# Patient Record
Sex: MALE | Race: WHITE | NOT HISPANIC OR LATINO | URBAN - METROPOLITAN AREA
[De-identification: names, ages, dates, MRNs, and addresses within clinical notes are randomized per-mention and may not be internally consistent; named-entity substitution may affect disease eponyms.]

---

## 2018-12-15 ENCOUNTER — EMERGENCY (EMERGENCY)
Facility: HOSPITAL | Age: 53
LOS: 1 days | Discharge: HOME | End: 2018-12-15
Admitting: EMERGENCY MEDICINE
Payer: SELF-PAY

## 2018-12-15 VITALS
HEART RATE: 98 BPM | DIASTOLIC BLOOD PRESSURE: 77 MMHG | OXYGEN SATURATION: 98 % | RESPIRATION RATE: 18 BRPM | SYSTOLIC BLOOD PRESSURE: 130 MMHG | TEMPERATURE: 98 F

## 2018-12-15 PROCEDURE — 99053 MED SERV 10PM-8AM 24 HR FAC: CPT

## 2018-12-15 PROCEDURE — 99284 EMERGENCY DEPT VISIT MOD MDM: CPT | Mod: 25

## 2018-12-15 NOTE — ED ADULT NURSE NOTE - OBJECTIVE STATEMENT
Pt BIBA for AMS, admits to ETOH abuse. Airway patent and breathing is spontaneous and unlabored Pt BIBA for AMS, admits to ETOH abuse. Airway patent and breathing is spontaneous and unlabored, no injuries noted. Ambulating with Pt BIBA for AMS, admits to ETOH abuse. Airway patent and breathing is spontaneous and unlabored, no injuries noted. Ambulating with steady gait w/o assistance.

## 2018-12-15 NOTE — ED ADULT NURSE NOTE - CHPI ED NUR SYMPTOMS NEG
no nausea/no abdominal distension/no chills/no abdominal pain/no fever/no pain/no vomiting/no weakness

## 2018-12-15 NOTE — ED ADULT TRIAGE NOTE - CHIEF COMPLAINT QUOTE
BIBA for etoh intoxication and AMS. Patient is awake alert responsive and ambulatory with assist. As per ems, patient was picked up [place] [address] No signs of injury noted to pt's person. Refusing vs at triage, will reattempt accordingly.

## 2018-12-15 NOTE — ED ADULT NURSE NOTE - NSIMPLEMENTINTERV_GEN_ALL_ED
Implemented All Fall Risk Interventions:  [place] to call system. Call bell, personal items and telephone within reach. Instruct patient to call for assistance. Room bathroom lighting operational. Non-slip footwear when patient is off stretcher. Physically safe environment: no spills, clutter or unnecessary equipment. Stretcher in lowest position, wheels locked, appropriate side rails in place. Provide visual cue, wrist band, yellow gown, etc. Monitor gait and stability. Monitor for mental status changes and reorient to person, place, and time. Review medications for side effects contributing to fall risk. Reinforce activity limits and safety measures with patient and family.

## 2018-12-16 NOTE — ED PROVIDER NOTE - MEDICAL DECISION MAKING DETAILS
alcohol intox, dispo pending sobriety alcohol intox, dispo pending sobriety  At the time of discharge from the Emergency Department, the patient is alert with fluent appropriate speech and ambulatory without difficulty. A complete medical screening examination was performed and no emergency medical condition was identified.

## 2018-12-19 DIAGNOSIS — F10.129 ALCOHOL ABUSE WITH INTOXICATION, UNSPECIFIED: ICD-10-CM

## 2018-12-29 ENCOUNTER — EMERGENCY (EMERGENCY)
Facility: HOSPITAL | Age: 53
LOS: 1 days | Discharge: HOME | End: 2018-12-29
Attending: EMERGENCY MEDICINE | Admitting: EMERGENCY MEDICINE
Payer: SELF-PAY

## 2018-12-29 VITALS
TEMPERATURE: 97 F | RESPIRATION RATE: 18 BRPM | DIASTOLIC BLOOD PRESSURE: 64 MMHG | OXYGEN SATURATION: 98 % | SYSTOLIC BLOOD PRESSURE: 122 MMHG | HEART RATE: 84 BPM

## 2018-12-29 DIAGNOSIS — F10.129 ALCOHOL ABUSE WITH INTOXICATION, UNSPECIFIED: ICD-10-CM

## 2018-12-29 LAB — ETHANOL SERPL-MCNC: 335 MG/DL — HIGH

## 2018-12-29 PROCEDURE — 99220: CPT

## 2018-12-29 NOTE — ED ADULT TRIAGE NOTE - CHIEF COMPLAINT QUOTE
biba for ams due to etoh intoxication as per ems. Patient is sleeping in stretcher but easily rousable to verbal and tactile stimuli. No injury noted at triage. Bystanders called for him on [address] because he was laying in the street.

## 2018-12-29 NOTE — ED PROVIDER NOTE - MEDICAL DECISION MAKING DETAILS
Alcohol intox BIBEMS after bystanders called, no signs of trauma, will place in CDU, patient unsafe for d/c

## 2018-12-29 NOTE — ED ADULT NURSE NOTE - NSIMPLEMENTINTERV_GEN_ALL_ED
Implemented All Fall Risk Interventions:  [place] to call system. Call bell, personal items and telephone within reach. Instruct patient to call for assistance. Room bathroom lighting operational. Non-slip footwear when patient is off stretcher. Physically safe environment: no spills, clutter or unnecessary equipment. Stretcher in lowest position, wheels locked, appropriate side rails in place. Provide visual cue, wrist band, yellow gown, etc. Monitor gait and stability. Monitor for mental status changes and reorient to person, place, and time. Review medications for side effects contributing to fall risk. Reinforce activity limits and safety measures with patient and family.

## 2018-12-30 VITALS
DIASTOLIC BLOOD PRESSURE: 71 MMHG | TEMPERATURE: 97 F | SYSTOLIC BLOOD PRESSURE: 114 MMHG | RESPIRATION RATE: 16 BRPM | HEART RATE: 67 BPM | OXYGEN SATURATION: 97 %

## 2018-12-30 PROCEDURE — 99217: CPT

## 2018-12-30 NOTE — ED CDU PROVIDER DISPOSITION NOTE - CLINICAL COURSE
Now awake and alert and wishes to be discharged.  Gait steady.  Oriented to person place and time of day.

## 2018-12-30 NOTE — ED CDU PROVIDER DISPOSITION NOTE - NSFOLLOWUPINSTRUCTIONS_ED_ALL_ED_FT
Please refrain from drinking alcohol.  Please call the patient access center for a primary care follow up or see your doctor this week.

## 2018-12-30 NOTE — ED CDU PROVIDER DISPOSITION NOTE - NSFOLLOWUPCLINICS_GEN_ALL_ED_FT
Alcoholics Anonymous - 24 hour hotline  Alcoholics Anonymous  .     Phone: [phone]  Fax:   Follow Up Time:

## 2019-02-03 NOTE — ED ADULT NURSE NOTE - NS ED NURSE RECORD ANOTHER HT AND WT
Pt refusing evening medications at this time. He said he will take them after he eats dinner. Medications rescheduled.  Pt waiting for his Mom to bring him dinner.    No

## 2019-03-31 ENCOUNTER — EMERGENCY (EMERGENCY)
Facility: HOSPITAL | Age: 54
LOS: 1 days | Discharge: HOME | End: 2019-03-31
Attending: EMERGENCY MEDICINE | Admitting: EMERGENCY MEDICINE
Payer: SELF-PAY

## 2019-03-31 VITALS
OXYGEN SATURATION: 97 % | RESPIRATION RATE: 17 BRPM | DIASTOLIC BLOOD PRESSURE: 80 MMHG | TEMPERATURE: 98 F | HEART RATE: 74 BPM | SYSTOLIC BLOOD PRESSURE: 153 MMHG

## 2019-03-31 PROCEDURE — 99284 EMERGENCY DEPT VISIT MOD MDM: CPT

## 2019-04-01 VITALS
TEMPERATURE: 97 F | OXYGEN SATURATION: 98 % | DIASTOLIC BLOOD PRESSURE: 80 MMHG | SYSTOLIC BLOOD PRESSURE: 128 MMHG | HEART RATE: 71 BPM | RESPIRATION RATE: 18 BRPM

## 2019-04-01 NOTE — ED PROVIDER NOTE - UNABLE TO OBTAIN
patient arrives intoxicated unable to provide a history or cooperate with physical exam Urgent need for Intervention

## 2019-04-01 NOTE — ED ADULT NURSE NOTE - NSIMPLEMENTINTERV_GEN_ALL_ED
Implemented All Fall Risk Interventions:  [place] to call system. Call bell, personal items and telephone within reach. Instruct patient to call for assistance. Room bathroom lighting operational. Non-slip footwear when patient is off stretcher. Physically safe environment: no spills, clutter or unnecessary equipment. Stretcher in lowest position, wheels locked, appropriate side rails in place. Provide visual cue, wrist band, yellow gown, etc. Monitor gait and stability. Monitor for mental status changes and reorient to person, place, and time. Review medications for side effects contributing to fall risk. Reinforce activity limits and safety measures with patient and family.

## 2019-04-03 DIAGNOSIS — F10.129 ALCOHOL ABUSE WITH INTOXICATION, UNSPECIFIED: ICD-10-CM

## 2019-04-03 DIAGNOSIS — R41.82 ALTERED MENTAL STATUS, UNSPECIFIED: ICD-10-CM

## 2019-07-16 NOTE — ED CDU PROVIDER DISPOSITION NOTE - NS ED MD DISPO DISCHARGE CCDA
Order placed for Neurology. Ban[employer]! message sent to patient with neurology number. Patient/Caregiver provided printed discharge information.

## 2020-04-10 ENCOUNTER — EMERGENCY (EMERGENCY)
Facility: HOSPITAL | Age: 55
LOS: 1 days | Discharge: HOME | End: 2020-04-10
Attending: EMERGENCY MEDICINE
Payer: SELF-PAY

## 2020-04-10 VITALS
HEART RATE: 74 BPM | SYSTOLIC BLOOD PRESSURE: 126 MMHG | OXYGEN SATURATION: 98 % | RESPIRATION RATE: 20 BRPM | HEIGHT: 73 IN | TEMPERATURE: 99 F | WEIGHT: 214.95 LBS | DIASTOLIC BLOOD PRESSURE: 83 MMHG

## 2020-04-10 LAB — ETHANOL SERPL-MCNC: 381 MG/DL — HIGH (ref 0–10)

## 2020-04-10 PROCEDURE — 70450 CT HEAD/BRAIN W/O DYE: CPT | Mod: 26

## 2020-04-10 NOTE — ED PROVIDER NOTE - PROGRESS NOTE DETAILS
Alcohol level appropriately elevated for degree of unresponsiveness.  Signed out to Gil Jackson pending CT head and sobriety/ disposition Manuel: Patient endorsed to me to f/u CT and reassess for clinical sobriety. ETOH 380. CT negative for acute findings. Patient reassessed and ambulated with steady gait and no complaints. Will DC home.

## 2020-04-10 NOTE — ED ADULT NURSE NOTE - OBJECTIVE STATEMENT
Pt received in #29[facility] via EMS with c/o alcohol intoxication. Pt is minimall responsive to painful stimuli but with spontaneous breathing with adequate chest rise. No visible signs of trauma noted.

## 2020-04-10 NOTE — ED PROVIDER NOTE - PATIENT PORTAL LINK FT
You can access the FollowMyHealth Patient Portal offered by [facility] by registering at the following website: [email or web address]. By joining [employer]’s FollowMyHealth portal, you will also be able to view your health information using other applications (apps) compatible with our system.

## 2020-04-10 NOTE — ED ADULT NURSE NOTE - NS ED NURSE DC INFO COMPLEXITY
Discharge instructions given and discussed, signed copy in chart. Pt verbalized understanding and all questions answered. All prescriptions sent to pt pharmacy and [facility]. Pt verbalized understanding of need to  lovenox from [facility]. Pt provided education on lovenox administration, demonstrated administration. Pt discharged home in stable condition on 3L O2 via wheelchair escorted by nurse. Personal belongings with patient. IV removed and tolerated well. Tele box removed, monitor room notified.    Simple: Patient demonstrates quick and easy understanding

## 2020-04-10 NOTE — ED PROVIDER NOTE - CARE PLAN
Principal Discharge DX:	Alcoholic intoxication without complication  Secondary Diagnosis:	Alteration of consciousness

## 2020-04-10 NOTE — ED PROVIDER NOTE - CLINICAL SUMMARY MEDICAL DECISION MAKING FREE TEXT BOX
No overt signs of trauma however pt responds to deep pain only, Will obtain CT scan brain and alcohol level.

## 2020-04-11 VITALS
HEART RATE: 76 BPM | SYSTOLIC BLOOD PRESSURE: 128 MMHG | TEMPERATURE: 98 F | DIASTOLIC BLOOD PRESSURE: 71 MMHG | OXYGEN SATURATION: 100 % | RESPIRATION RATE: 18 BRPM

## 2020-04-11 PROCEDURE — 80307 DRUG TEST PRSMV CHEM ANLYZR: CPT

## 2020-04-11 PROCEDURE — 99285 EMERGENCY DEPT VISIT HI MDM: CPT | Mod: 25

## 2020-04-11 PROCEDURE — 99285 EMERGENCY DEPT VISIT HI MDM: CPT

## 2020-04-11 PROCEDURE — 70450 CT HEAD/BRAIN W/O DYE: CPT

## 2020-04-11 PROCEDURE — 82962 GLUCOSE BLOOD TEST: CPT

## 2020-04-11 PROCEDURE — 36415 COLL VENOUS BLD VENIPUNCTURE: CPT

## 2020-07-03 ENCOUNTER — EMERGENCY (EMERGENCY)
Facility: HOSPITAL | Age: 55
LOS: 1 days | Discharge: HOME | End: 2020-07-03
Admitting: EMERGENCY MEDICINE
Payer: MEDICAID

## 2020-07-03 VITALS
SYSTOLIC BLOOD PRESSURE: 128 MMHG | HEART RATE: 80 BPM | DIASTOLIC BLOOD PRESSURE: 80 MMHG | TEMPERATURE: 99 F | OXYGEN SATURATION: 95 % | RESPIRATION RATE: 18 BRPM
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
## 2020-07-03 DIAGNOSIS — R41.82 ALTERED MENTAL STATUS, UNSPECIFIED: ICD-10-CM

## 2020-07-03 DIAGNOSIS — F10.129 ALCOHOL ABUSE WITH INTOXICATION, UNSPECIFIED: ICD-10-CM

## 2020-07-03 PROCEDURE — 99284 EMERGENCY DEPT VISIT MOD MDM: CPT

## 2020-07-03 NOTE — ED ADULT TRIAGE NOTE - CHIEF COMPLAINT QUOTE
lukas for AMS from [facility]. Accomp by friend. as per ems, patient drank at least one pint of vodka. Patient arrives with no injuries noted.

## 2020-07-03 NOTE — ED ADULT NURSE NOTE - CHIEF COMPLAINT QUOTE
lukas for AMS from [facility]. Accomp by friend. as per ems, patient drank at least one pint of vodka. Patient arrives with no injuries noted.

## 2020-07-03 NOTE — ED PROVIDER NOTE - OBJECTIVE STATEMENT
54 yo M with no known PMHx, BIBA with friend for AMS.  Pt was found altered and friend reports that he had at lease one pint of alcohol 54 yo M with PMHx of alcoholism, BIBA with friend for AMS.  Pt was found altered and friend reports that he had at lease one pint of alcohol today and found him difficult to arouse. Pt BIBA for public EtOH intox. Admits to having heavy EtOH intake today.  Denies drug use or other illicits. No acute medical complaints or apparent trauma noted.  Minimally cooperative with hx and ROS due to heavy intox. No bleeding, respiratory distress, pain, vomiting, or urinary/bowel incontinence noted.

## 2020-07-03 NOTE — ED PROVIDER NOTE - PHYSICAL EXAMINATION
Gen - Unkempt M, flushed appearing, +AOB, lethargic but arousable by verbal stimuli  Skin - warm, dry, intact  HEENT - AT/NC, PERRL, mild conjunctival injection, pupils 3mm b/l, TM intact with no hemotympanium b/l, no facial contusion or periorbital ecchymosis, o/p clear, uvula midline, airway patent, neck supple with no step off or midline tenderness, FROM   CV - S1S2, R/R/R  Resp - respiration non-labored, CTAB, symmetric bs b/l, no r/r/w  GI - NABS, soft, ND, NT, no rebound or guarding, no CVAT b/l  MS - moving all extremities, no c/c/e   Neuro - Lethargic but arousable by verbal stimuli, slurred speech and unsteady gait

## 2020-07-03 NOTE — ED PROVIDER NOTE - PATIENT PORTAL LINK FT
You can access the FollowMyHealth Patient Portal offered by [facility] by registering at the following website: [email or web address]. By joining [employer]’s FollowMyHealth portal, you will also be able to view your health information using other applications (apps) compatible with our system.

## 2020-07-03 NOTE — ED ADULT NURSE NOTE - OBJECTIVE STATEMENT
intoxicated bib ema and sponsor nil obvious injury to person, admits to alcohol tonight (vodka) denies illict drug use , talking in full sentences, calm and compliant

## 2020-07-03 NOTE — ED PROVIDER NOTE - NSFOLLOWUPINSTRUCTIONS_ED_ALL_ED_FT
Alcohol Abuse    Alcohol intoxication occurs when the amount of alcohol that a person has consumed impairs his or her ability to mentally and physically function. Chronic alcohol consumption can also lead to a variety of health issues including neurological disease, stomach disease, heart disease, liver disease, etc. Do not drive after drinking alcohol. Drinking enough alcohol to end up in an Emergency Room suggests you may have an alcohol abuse problem. Seek help at a drug addiction center.    SEEK IMMEDIATE MEDICAL CARE IF YOU HAVE ANY OF THE FOLLOWING SYMPTOMS: seizures, vomiting blood, blood in your stool, lightheadedness/dizziness, or becoming shaky to tremulous when you stop drinking. Alcohol Abuse    Alcohol intoxication occurs when the amount of alcohol that a person has consumed impairs his or her ability to mentally and physically function. Chronic alcohol consumption can also lead to a variety of health issues including neurological disease, stomach disease, heart disease, liver disease, etc. Do not drive after drinking alcohol. Drinking enough alcohol to end up in an Emergency Room suggests you may have an alcohol abuse problem. Seek help at a drug addiction center.    SEEK IMMEDIATE MEDICAL CARE IF YOU HAVE ANY OF THE FOLLOWING SYMPTOMS: seizures, vomiting blood, blood in your stool, lightheadedness/dizziness, or becoming shaky to tremulous when you stop drinking.    Follow up with your primary care doctor or clinics listed below if you do not have a doctor,    [address] [address]  To make an appointment, call [phone]    [facility]  Address: [address] [place] [id number]  Appointment Center: [phone] ([phone])     Return immediately for any new or worsening symptoms or any new concerns

## 2020-07-03 NOTE — ED PROVIDER NOTE - PRO INTERPRETER NEED 2
PT arrived to ED via EMS c/o a seizure. PT had 3 witnessed seizure from his roommates.  Per roommates, patient has not been taking his seizure medication but has been drinking
English

## 2020-10-14 NOTE — ED ADULT TRIAGE NOTE - NS ED NURSE AMBULANCES
----- Message from Hannah Infante MD sent at 10/13/2020  4:25 PM CDT -----  Labs were OK [facility]

## 2021-01-12 NOTE — ED CDU PROVIDER INITIAL DAY NOTE - SKIN, MLM
<-------- Click here to INCLUDE CoVID-19 Discharge Instructions
Skin normal color for race, warm, dry and intact. No evidence of rash.

## 2022-03-06 ENCOUNTER — EMERGENCY (EMERGENCY)
Facility: HOSPITAL | Age: 57
LOS: 1 days | Discharge: HOME | End: 2022-03-06
Attending: EMERGENCY MEDICINE | Admitting: EMERGENCY MEDICINE
Payer: MEDICAID

## 2022-03-06 VITALS
RESPIRATION RATE: 16 BRPM | SYSTOLIC BLOOD PRESSURE: 134 MMHG | DIASTOLIC BLOOD PRESSURE: 82 MMHG | TEMPERATURE: 98 F | OXYGEN SATURATION: 97 % | HEART RATE: 61 BPM

## 2022-03-06 VITALS
SYSTOLIC BLOOD PRESSURE: 119 MMHG | TEMPERATURE: 98 F | DIASTOLIC BLOOD PRESSURE: 78 MMHG | HEART RATE: 70 BPM | OXYGEN SATURATION: 99 % | RESPIRATION RATE: 18 BRPM | WEIGHT: 175.05 LBS

## 2022-03-06 PROCEDURE — 99284 EMERGENCY DEPT VISIT MOD MDM: CPT

## 2022-03-06 NOTE — ED PROVIDER NOTE - NS ED ATTENDING STATEMENT MOD
"Injectable Influenza Immunization Documentation    1.  Has the patient received the information for the injectable influenza vaccine? YES     2. Is the patient 6 months of age or older? YES     3. Does the patient have any of the following contraindications?         Severe allergy to eggs? No     Severe allergic reaction to previous influenza vaccines? No   Severe allergy to latex? No       History of Guillain-[place] syndrome? No     Currently have a temperature greater than 100.4F? No        4.  Severely egg allergic patients should have flu vaccine eligibility assessed by an MD, RN, or pharmacist, and those who received flu vaccine should be observed for 15 min by an MD, RN, Pharmacist, Medical Technician, or member of clinic staff.\": YES    5. Latex-allergic patients should be given latex-free influenza vaccine. Please reference the Vaccine latex table to determine if your clinic s product is latex-containing.       Vaccination given by Kathryn Vann CMA          " Attending Only

## 2022-03-06 NOTE — ED ADULT NURSE NOTE - OBJECTIVE STATEMENT
Pt came in c/o of ams. Pt reports drinking etoh today. As per EMS pt found with etoh. No s/s of head injury/trauma/bleeding. Pt responsive to voice, Bed alarm on. Pt sleeping comfortably in bed

## 2022-03-06 NOTE — ED ADULT NURSE REASSESSMENT NOTE - NS ED NURSE REASSESS COMMENT FT1
Pt received from Daniel BURK. Pt resting comfortably in bed. No s/s of acute distress. Pt given dc papers. A&Ox3 speaking in full sentences. ambulatory with steady gait. Will continue to monitor

## 2022-03-06 NOTE — ED ADULT TRIAGE NOTE - CHIEF COMPLAINT QUOTE
here for ams, pt admits to drinking alcohol- was found lying down outside a [address] with four locos. pt responds to voice

## 2022-03-06 NOTE — ED PROVIDER NOTE - PATIENT PORTAL LINK FT
You can access the FollowMyHealth Patient Portal offered by [facility] by registering at the following website: [email or web address]. By joining [employer]’s FollowMyHealth portal, you will also be able to view your health information using other applications (apps) compatible with our system.

## 2022-03-08 DIAGNOSIS — F10.929 ALCOHOL USE, UNSPECIFIED WITH INTOXICATION, UNSPECIFIED: ICD-10-CM

## 2022-03-08 DIAGNOSIS — R41.82 ALTERED MENTAL STATUS, UNSPECIFIED: ICD-10-CM

## 2022-08-05 ENCOUNTER — EMERGENCY (EMERGENCY)
Facility: HOSPITAL | Age: 57
LOS: 1 days | Discharge: HOME | End: 2022-08-05
Attending: EMERGENCY MEDICINE | Admitting: EMERGENCY MEDICINE

## 2022-08-05 ENCOUNTER — EMERGENCY (EMERGENCY)
Facility: HOSPITAL | Age: 57
LOS: 1 days | Discharge: HOME | End: 2022-08-05
Admitting: EMERGENCY MEDICINE
Payer: MEDICAID

## 2022-08-05 VITALS
DIASTOLIC BLOOD PRESSURE: 76 MMHG | SYSTOLIC BLOOD PRESSURE: 120 MMHG | RESPIRATION RATE: 18 BRPM | HEART RATE: 77 BPM | TEMPERATURE: 97 F | OXYGEN SATURATION: 95 % | WEIGHT: 190.04 LBS

## 2022-08-05 VITALS
SYSTOLIC BLOOD PRESSURE: 124 MMHG | OXYGEN SATURATION: 96 % | HEART RATE: 71 BPM | RESPIRATION RATE: 18 BRPM | DIASTOLIC BLOOD PRESSURE: 69 MMHG | TEMPERATURE: 98 F

## 2022-08-05 PROCEDURE — 99283 EMERGENCY DEPT VISIT LOW MDM: CPT

## 2022-08-05 PROCEDURE — 99285 EMERGENCY DEPT VISIT HI MDM: CPT

## 2022-08-05 PROCEDURE — 70450 CT HEAD/BRAIN W/O DYE: CPT | Mod: 26

## 2022-08-05 PROCEDURE — 72125 CT NECK SPINE W/O DYE: CPT | Mod: 26

## 2022-08-05 NOTE — ED ADULT NURSE NOTE - NSIMPLEMENTINTERV_GEN_ALL_ED
Implemented All Fall Risk Interventions:  [place] to call system. Call bell, personal items and telephone within reach. Instruct patient to call for assistance. Room bathroom lighting operational. Non-slip footwear when patient is off stretcher. Physically safe environment: no spills, clutter or unnecessary equipment. Stretcher in lowest position, wheels locked, appropriate side rails in place. Provide visual cue, wrist band, yellow gown, etc. Monitor gait and stability. Monitor for mental status changes and reorient to person, place, and time. Review medications for side effects contributing to fall risk. Reinforce activity limits and safety measures with patient and family.

## 2022-08-05 NOTE — ED PROVIDER NOTE - OBJECTIVE STATEMENT
brought in by ems with altered mental status.  Admits to drinking.  Denies trauma.  Unable to provide further coherent history due to ams. States he is sorry.

## 2022-08-05 NOTE — ED ADULT NURSE REASSESSMENT NOTE - NS ED NURSE REASSESS COMMENT FT1
Pt care handed over from amelia spears, checked in on patient, laying on back with knees bent, feet flat on bed, well perfused, resp rate 16 bpm, eyes open to name, side rails x2 in upright position, pt woke up while writing this report, walked out to bathroom , not fully steady on feet, pleasant in mood, ushered to bathroom , will redirect back to bed

## 2022-08-05 NOTE — ED PROVIDER NOTE - NSFOLLOWUPINSTRUCTIONS_ED_ALL_ED_FT
Please see your primary care provider for followup.  Call for appointment.  If you have any problems with followup, please call the ED Referral Coordinator at [phone].  Return to the ER if symptoms worsen or other concerns.      Alcohol Abuse    Alcohol intoxication occurs when the amount of alcohol that a person has consumed impairs his or her ability to mentally and physically function. Chronic alcohol consumption can also lead to a variety of health issues including neurological disease, stomach disease, heart disease, liver disease, etc. Do not drive after drinking alcohol. Drinking enough alcohol to end up in an Emergency Room suggests you may have an alcohol abuse problem. Seek help at a drug addiction center.    SEEK IMMEDIATE MEDICAL CARE IF YOU HAVE ANY OF THE FOLLOWING SYMPTOMS: seizures, vomiting blood, blood in your stool, lightheadedness/dizziness, or becoming shaky to tremulous when you stop drinking.

## 2022-08-05 NOTE — ED PROVIDER NOTE - PHYSICAL EXAMINATION
· CONSTITUTIONAL: smells of alcohol, well nourished, awake, alert, oriented to person,  and in no apparent distress. speech slurred. stating he is sorry over and over  · ENMT: Airway patent, Nasal mucosa clear. Mouth with normal mucosa.  · HEAD: Head is atraumatic. Head shape is symmetrical.  · EYES: Clear bilaterally, pupils equal, round and reactive to light.  · CARDIAC: Normal rate, regular rhythm.  Heart sounds S1, S2.  · RESPIRATORY: Breath sounds clear and equal bilaterally.  · GASTROINTESTINAL: Abdomen soft, no guarding.  · MUSCULOSKELETAL: Spine appears normal, range of motion is not limited  · NEUROLOGICAL: Alert and oriented to self, speech slurred, not following formal neuro exam  · SKIN: Skin normal color for race, warm, dry and intact. No evidence of rash.  · PSYCHIATRIC: Alert and oriented to person. intoxicated affect. no apparent risk to self or others.  · HEME LYMPH: No adenopathy

## 2022-08-05 NOTE — ED ADULT NURSE NOTE - OBJECTIVE STATEMENT
pt. picked up from the street for altered mental status, PT. admits to drinking alcohol today denies any drug use, no visible injuries noted. Resting comfortably on the stretcher

## 2022-08-05 NOTE — ED ADULT TRIAGE NOTE - CHIEF COMPLAINT QUOTE
here for ams- pt is tearful in triage admits to drinking alcohol- pt with no obvious injury or bruising-

## 2022-08-05 NOTE — ED PROVIDER NOTE - PATIENT PORTAL LINK FT
You can access the FollowMyHealth Patient Portal offered by [facility] by registering at the following website: [email or web address]. By joining [employer]’s FollowMyHealth portal, you will also be able to view your health information using other applications (apps) compatible with our system.

## 2022-08-05 NOTE — ED ADULT TRIAGE NOTE - CHIEF COMPLAINT QUOTE
BIBA from street with AMS after drinking alcohol. no s/s of trauma/injury noted. BIBA from street with AMS after drinking alcohol. no s/s of trauma/injury reported.

## 2022-08-06 VITALS
DIASTOLIC BLOOD PRESSURE: 70 MMHG | OXYGEN SATURATION: 98 % | TEMPERATURE: 98 F | SYSTOLIC BLOOD PRESSURE: 108 MMHG | RESPIRATION RATE: 19 BRPM | HEART RATE: 68 BPM

## 2022-08-06 PROBLEM — Z78.9 OTHER SPECIFIED HEALTH STATUS: Chronic | Status: ACTIVE | Noted: 2018-12-29

## 2022-08-06 NOTE — ED PROVIDER NOTE - OBJECTIVE STATEMENT
58 yo m bibems for AMS, possibly 2/2 substance use, fall reported.  limited history 2/2 mental status.

## 2022-08-06 NOTE — ED PROVIDER NOTE - CLINICAL SUMMARY MEDICAL DECISION MAKING FREE TEXT BOX
ams, possibly 2/2 substance, fall reported, no trauma noted, protecting airway, will monitor for safety and reassessment for mental status

## 2022-08-06 NOTE — ED PROVIDER NOTE - PATIENT PORTAL LINK FT
You can access the FollowMyHealth Patient Portal offered by [facility] by registering at the following website: [email or web address]. By joining [employer]’s FollowMyHealth portal, you will also be able to view your health information using other applications (apps) compatible with our system.

## 2022-08-07 DIAGNOSIS — R41.82 ALTERED MENTAL STATUS, UNSPECIFIED: ICD-10-CM

## 2022-08-07 DIAGNOSIS — F10.920 ALCOHOL USE, UNSPECIFIED WITH INTOXICATION, UNCOMPLICATED: ICD-10-CM

## 2022-08-07 DIAGNOSIS — F10.929 ALCOHOL USE, UNSPECIFIED WITH INTOXICATION, UNSPECIFIED: ICD-10-CM

## 2023-04-12 NOTE — ED PROVIDER NOTE - NS_EDPROVIDERDISPOUSERTYPE_ED_A_ED
I have personally evaluated and examined the patient. The Attending was available to me as a supervising provider if needed. Thalidomide Counseling: I discussed with the patient the risks of thalidomide including but not limited to birth defects, anxiety, weakness, chest pain, dizziness, cough and severe allergy.

## 2024-03-06 ENCOUNTER — EMERGENCY (EMERGENCY)
Facility: HOSPITAL | Age: 59
LOS: 1 days | Discharge: HOME | End: 2024-03-06
Attending: EMERGENCY MEDICINE | Admitting: EMERGENCY MEDICINE
Payer: SELF-PAY

## 2024-03-06 VITALS
HEART RATE: 77 BPM | DIASTOLIC BLOOD PRESSURE: 73 MMHG | TEMPERATURE: 97 F | OXYGEN SATURATION: 96 % | SYSTOLIC BLOOD PRESSURE: 115 MMHG | RESPIRATION RATE: 18 BRPM

## 2024-03-06 VITALS
RESPIRATION RATE: 16 BRPM | TEMPERATURE: 98 F | OXYGEN SATURATION: 98 % | SYSTOLIC BLOOD PRESSURE: 133 MMHG | HEART RATE: 87 BPM | DIASTOLIC BLOOD PRESSURE: 71 MMHG

## 2024-03-06 DIAGNOSIS — F10.120 ALCOHOL ABUSE WITH INTOXICATION, UNCOMPLICATED: ICD-10-CM

## 2024-03-06 DIAGNOSIS — R41.82 ALTERED MENTAL STATUS, UNSPECIFIED: ICD-10-CM

## 2024-03-06 LAB
ETHANOL SERPL-MCNC: 365 MG/DL — HIGH
GLUCOSE BLDC GLUCOMTR-MCNC: 133 MG/DL — HIGH (ref 70–99)

## 2024-03-06 PROCEDURE — 99222 1ST HOSP IP/OBS MODERATE 55: CPT

## 2024-03-06 PROCEDURE — 99053 MED SERV 10PM-8AM 24 HR FAC: CPT

## 2024-03-06 NOTE — ED PROVIDER NOTE - OBJECTIVE STATEMENT
58-year-old male unknown past medical history, has had prior visits for ED alcohol intoxication presents with altered mental status.  Patient has alcohol on breath unable to provide a history or cooperate physical exam secondary to altered mental status

## 2024-03-06 NOTE — ED CDU PROVIDER DISPOSITION NOTE - NSFOLLOWUPINSTRUCTIONS_ED_ALL_ED_FT
Alcohol Use Disorder    WHAT YOU NEED TO KNOW:    Alcohol use disorder (AUD) is problem drinking. AUD includes alcohol abuse and alcohol dependency.     DISCHARGE INSTRUCTIONS:    Seek care immediately if:     Your heart is beating faster than usual.      You have hallucinations.      You cannot remember what happens while you are drinking.      You have seizures.    Contact your healthcare provider if:     You are anxious and have nausea.      Your hands are shaky and you are sweating heavily.      You have questions or concerns about your condition or care.    Follow up with your healthcare provider as directed: Do not try to stop drinking on your own. Your healthcare provider may need to help you withdraw from alcohol safely. He may need to admit you to the hospital. You may also need any of the following treatments:    Medicines to decrease your craving for alcohol      Support groups such as Alcoholics Anonymous       Therapy from a psychiatrist or psychologist       Admission to an inpatient facility for treatment for severe AUD    Interested in discussing options to reduce your alcohol or drug use?      [facility]: [phone]   [facility] Substance Abuse Services: [phone], option #2   Methadone Maintenance & Ambulatory Opiate Detox: [phone]  [facility]: [phone]  [facility]: [phone]  [facility]: [phone]    [facility]: [phone], option #2   [facility]: [phone]    [facility]: [phone]    [facility] Central Intake: [phone]  [facility] Chemical Dependency/Ancillary Withdrawal: [phone]  [facility] Methadone Maintenance: [phone]    [facility]: [phone]  [facility] Addiction Treatment Services: [phone]    [facility]: 8-894-1-HOPENY    [facility] Office of Alcoholism and Substance Abuse Services (OASAS): https://www.oasas.[place].gov/providerdirectory/  [facility] for Addiction Services and Psychotherapy Interventions Research (CASPIR)  www.[facility].org     Interested in discussing options to reduce your tobacco use?    [facility] for Tobacco Control:  [phone]  [facility] QUITLINE: 3-569-WD-QUITS ([phone])    Interested in learning more about substance use?      http://rethinkingdrinking.niaaa.nih.gov   https://www.drugabuse.gov/patients-families     Learn more about opioid overdose prevention programs in [facility]:  http://www.[facility].ny.[facility]/diseases/aids/general/opioid_overdose_prevention/

## 2024-03-06 NOTE — ED PROVIDER NOTE - CLINICAL SUMMARY MEDICAL DECISION MAKING FREE TEXT BOX
Patient presents with altered mental status likely secondary to EtOH intoxication. Patient maintained his airway, and will plan to metabolize to sobriety Patient with no head trauma to suggest intracranial hemorrhage, no overt signs of opioid intoxication or coingestion. No infectious symptoms and afebrile so doubt sepsis. Patient with no signs of any medical emergencies at this time.     Will continue to monitor for improving sobriety and draw alcohol level.

## 2024-03-06 NOTE — ED ADULT NURSE NOTE - CHIEF COMPLAINT QUOTE
biba from [place] for ams due to etoh ingestion per ems. Pt is not answering questions at triage or following commands. No obvious injuries noted.

## 2024-03-06 NOTE — ED ADULT NURSE NOTE - NSFALLUNIVINTERV_ED_ALL_ED
Bed/Stretcher in lowest position, wheels locked, appropriate side rails in place/Call bell, personal items and telephone in reach/Instruct patient to call for assistance before getting out of bed/chair/stretcher/Non-slip footwear applied when patient is off stretcher/[place] to call system/Physically safe environment - no spills, clutter or unnecessary equipment/Purposeful proactive rounding/Room/bathroom lighting operational, light cord in reach

## 2024-03-06 NOTE — ED CDU PROVIDER DISPOSITION NOTE - PATIENT PORTAL LINK FT
You can access the FollowMyHealth Patient Portal offered by [facility] by registering at the following website: [email or web address]. By joining [employer]’s FollowMyHealth portal, you will also be able to view your health information using other applications (apps) compatible with our system.

## 2024-03-06 NOTE — ED ADULT NURSE NOTE - OBJECTIVE STATEMENT
Pt in ED d/t ETOH intox. Per triage, EMS stated had alcohol tonight. Not following commands during triage. Airway intact, rise and fall of chest noted. Ongoing care.

## 2024-03-06 NOTE — ED ADULT NURSE REASSESSMENT NOTE - NS ED NURSE REASSESS COMMENT FT1
Pt assisted to use urinal, pt with unsteady movements and difficulty following directions. Remains on bed alarm. Pending sobriety.

## 2024-03-06 NOTE — ED ADULT TRIAGE NOTE - CHIEF COMPLAINT QUOTE
biba from [place] for ams due to etoh ingestion per ems. Pt is not answering questions at triage or following commands. No obvious injuries noted.

## 2024-06-21 ENCOUNTER — EMERGENCY (EMERGENCY)
Facility: HOSPITAL | Age: 59
LOS: 1 days | Discharge: HOME | End: 2024-06-21
Admitting: EMERGENCY MEDICINE
Payer: SELF-PAY

## 2024-06-21 VITALS
DIASTOLIC BLOOD PRESSURE: 61 MMHG | WEIGHT: 179.9 LBS | RESPIRATION RATE: 16 BRPM | HEART RATE: 69 BPM | SYSTOLIC BLOOD PRESSURE: 106 MMHG | OXYGEN SATURATION: 95 % | TEMPERATURE: 98 F

## 2024-06-21 VITALS
HEART RATE: 58 BPM | TEMPERATURE: 97 F | RESPIRATION RATE: 16 BRPM | SYSTOLIC BLOOD PRESSURE: 112 MMHG | DIASTOLIC BLOOD PRESSURE: 68 MMHG | OXYGEN SATURATION: 95 %

## 2024-06-21 PROCEDURE — 99283 EMERGENCY DEPT VISIT LOW MDM: CPT

## 2024-06-21 NOTE — ED ADULT NURSE NOTE - OBJECTIVE STATEMENT
pt opened eyes to name, P4earl admits to alcohol use today denies illict drug use , well perfused, sitting upright in bed,

## 2024-06-21 NOTE — ED PROVIDER NOTE - PATIENT PORTAL LINK FT
You can access the FollowMyHealth Patient Portal offered by [facility] by registering at the following website: [email or web address]. By joining [employer]’s FollowMyHealth portal, you will also be able to view your health information using other applications (apps) compatible with our system.

## 2024-06-21 NOTE — ED PROVIDER NOTE - OBJECTIVE STATEMENT
Patient is a 59-year-old male presents today with altered mental status.  Admits to drinking lots of alcohol earlier today.  States that he fell asleep.  States that he did not fall or injure his head or use any kind of drugs.  He has no medical complaints at this time.  He is able to speak in complete in full sentences.

## 2024-06-21 NOTE — ED PROVIDER NOTE - CLINICAL SUMMARY MEDICAL DECISION MAKING FREE TEXT BOX
Lesion Type: Abscess Suture Text: The incision was partially closed with Render Postcare In Note?: No Wound Care: Petrolatum Anesthesia Volume In Cc: 1.5 Epidermal Sutures: 4-0 Ethilon Size Of Lesion In Cm (Optional But May Be Required For Some Insurances): 0 Anesthesia Type: 1% lidocaine with epinephrine Drainage Type?: purulent  and cyst-like Dressing: dry sterile dressing Consent was obtained and risks were reviewed including but not limited to delayed wound healing, infection, need for multiple I and D's, and pain. Drainage Amount?: moderate Epidermal Closure: simple interrupted Curette Text (Optional): After the contents were expressed a curette was used to partially remove the cyst wall. Post-Care Instructions: I reviewed with the patient in detail post-care instructions. Patient should keep wound covered and call the office should any redness, pain, swelling or worsening occur. I&D Type: complicated Detail Level: Detailed Method: 11 blade Curette: Yes Patient presents with alcohol intoxication.  Denies trauma or drug use.  No evidence of falls or trauma.  Monitored in the ER for several hours for clinical sobriety.  Patient ambulating around the department, requesting discharge, tolerating p.o. prior to discharge.

## 2024-06-21 NOTE — ED PROVIDER NOTE - NSFOLLOWUPINSTRUCTIONS_ED_ALL_ED_FT
Alcohol Use Disorder    WHAT YOU NEED TO KNOW:    Alcohol use disorder (AUD) is problem drinking. AUD includes alcohol abuse and alcohol dependency.     DISCHARGE INSTRUCTIONS:    Seek care immediately if:     Your heart is beating faster than usual.      You have hallucinations.      You cannot remember what happens while you are drinking.      You have seizures.    Contact your healthcare provider if:     You are anxious and have nausea.      Your hands are shaky and you are sweating heavily.      You have questions or concerns about your condition or care.    Follow up with your healthcare provider as directed: Do not try to stop drinking on your own. Your healthcare provider may need to help you withdraw from alcohol safely. He may need to admit you to the hospital. You may also need any of the following treatments:    Medicines to decrease your craving for alcohol      Support groups such as Alcoholics Anonymous       Therapy from a psychiatrist or psychologist       Admission to an inpatient facility for treatment for severe AUD    Interested in discussing options to reduce your alcohol or drug use?      [facility]: [phone]   [facility] Substance Abuse Services: [phone], option #2   Methadone Maintenance & Ambulatory Opiate Detox: [phone]  [facility]: [phone]  [facility]: [phone]  [facility]: [phone]    [facility]: [phone], option #2   [facility]: [phone]    [facility]: [phone]    [facility] Central Intake: [phone]  [facility] Chemical Dependency/Ancillary Withdrawal: [phone]  [facility] Methadone Maintenance: [phone]    [facility]: [phone]  [facility] Addiction Treatment Services: [phone]    [facility]: 5-965-1-HOPENY    [facility] Office of Alcoholism and Substance Abuse Services (OASAS): https://www.oasas.[place].gov/providerdirectory/  [facility] for Addiction Services and Psychotherapy Interventions Research (CASPIR)  www.[facility].org     Interested in discussing options to reduce your tobacco use?    [facility] for Tobacco Control:  [phone]  [facility] QUITLINE: 4-381-TL-QUITS ([phone])    Interested in learning more about substance use?      http://rethinkingdrinking.niaaa.nih.gov   https://www.drugabuse.gov/patients-families     Learn more about opioid overdose prevention programs in [facility]:  http://www.[facility].ny.[facility]/diseases/aids/general/opioid_overdose_prevention/

## 2024-06-25 DIAGNOSIS — R41.82 ALTERED MENTAL STATUS, UNSPECIFIED: ICD-10-CM

## 2024-06-25 DIAGNOSIS — F10.920 ALCOHOL USE, UNSPECIFIED WITH INTOXICATION, UNCOMPLICATED: ICD-10-CM

## 2024-07-10 ENCOUNTER — EMERGENCY (EMERGENCY)
Age: 59
LOS: 1 days | Discharge: HOME | End: 2024-07-10
Attending: EMERGENCY MEDICINE | Admitting: EMERGENCY MEDICINE
Payer: MEDICAID

## 2024-07-10 VITALS
OXYGEN SATURATION: 98 % | HEART RATE: 95 BPM | RESPIRATION RATE: 18 BRPM | TEMPERATURE: 98 F | SYSTOLIC BLOOD PRESSURE: 135 MMHG | DIASTOLIC BLOOD PRESSURE: 71 MMHG | WEIGHT: 195.11 LBS

## 2024-07-10 PROCEDURE — 99283 EMERGENCY DEPT VISIT LOW MDM: CPT

## 2024-07-12 DIAGNOSIS — F10.129 ALCOHOL ABUSE WITH INTOXICATION, UNSPECIFIED: ICD-10-CM

## 2024-09-26 ENCOUNTER — INPATIENT (INPATIENT)
Facility: HOSPITAL | Age: 59
LOS: 2 days | Discharge: HOME | End: 2024-09-29
Attending: STUDENT IN AN ORGANIZED HEALTH CARE EDUCATION/TRAINING PROGRAM | Admitting: INTERNAL MEDICINE
Payer: COMMERCIAL

## 2024-09-26 VITALS
DIASTOLIC BLOOD PRESSURE: 91 MMHG | WEIGHT: 179.9 LBS | RESPIRATION RATE: 18 BRPM | HEART RATE: 115 BPM | SYSTOLIC BLOOD PRESSURE: 135 MMHG | OXYGEN SATURATION: 96 % | TEMPERATURE: 98 F

## 2024-09-26 LAB
ALBUMIN SERPL ELPH-MCNC: 3.7 G/DL — SIGNIFICANT CHANGE UP (ref 3.4–5)
ALP SERPL-CCNC: 88 U/L — SIGNIFICANT CHANGE UP (ref 40–120)
ALT FLD-CCNC: 56 U/L — HIGH (ref 12–42)
ANION GAP SERPL CALC-SCNC: 11 MMOL/L — SIGNIFICANT CHANGE UP (ref 9–16)
AST SERPL-CCNC: 58 U/L — HIGH (ref 15–37)
BILIRUB DIRECT SERPL-MCNC: 0.2 MG/DL — SIGNIFICANT CHANGE UP (ref 0–0.3)
BILIRUB INDIRECT FLD-MCNC: 0.4 MG/DL — SIGNIFICANT CHANGE UP (ref 0.2–1)
BILIRUB SERPL-MCNC: 0.6 MG/DL — SIGNIFICANT CHANGE UP (ref 0.2–1.2)
BUN SERPL-MCNC: 12 MG/DL — SIGNIFICANT CHANGE UP (ref 7–23)
CALCIUM SERPL-MCNC: 8 MG/DL — LOW (ref 8.5–10.5)
CHLORIDE SERPL-SCNC: 106 MMOL/L — SIGNIFICANT CHANGE UP (ref 96–108)
CO2 SERPL-SCNC: 27 MMOL/L — SIGNIFICANT CHANGE UP (ref 22–31)
CREAT SERPL-MCNC: 0.76 MG/DL — SIGNIFICANT CHANGE UP (ref 0.5–1.3)
EGFR: 104 ML/MIN/1.73M2 — SIGNIFICANT CHANGE UP
ETHANOL SERPL-MCNC: 207 MG/DL — HIGH
GLUCOSE SERPL-MCNC: 105 MG/DL — HIGH (ref 70–99)
MAGNESIUM SERPL-MCNC: 1.6 MG/DL — SIGNIFICANT CHANGE UP (ref 1.6–2.6)
PHOSPHATE SERPL-MCNC: 3.6 MG/DL — SIGNIFICANT CHANGE UP (ref 2.5–4.5)
POTASSIUM SERPL-MCNC: 3.9 MMOL/L — SIGNIFICANT CHANGE UP (ref 3.5–5.3)
POTASSIUM SERPL-SCNC: 3.9 MMOL/L — SIGNIFICANT CHANGE UP (ref 3.5–5.3)
PROT SERPL-MCNC: 7 G/DL — SIGNIFICANT CHANGE UP (ref 6.4–8.2)
SODIUM SERPL-SCNC: 144 MMOL/L — SIGNIFICANT CHANGE UP (ref 132–145)

## 2024-09-26 PROCEDURE — 99285 EMERGENCY DEPT VISIT HI MDM: CPT

## 2024-09-26 RX ORDER — MULTI VITAMIN/MINERAL SUPPLEMENT WITH ASCORBIC ACID, NIACIN, PYRIDOXINE, PANTOTHENIC ACID, FOLIC ACID, RIBOFLAVIN, THIAMIN, BIOTIN, COBALAMIN AND ZINC. 60; 20; 12.5; 10; 10; 1.7; 1.5; 1; .3; .006 MG/1; MG/1; MG/1; MG/1; MG/1; MG/1; MG/1; MG/1; MG/1; MG/1
1 TABLET, COATED ORAL DAILY
Refills: 0 | Status: DISCONTINUED | OUTPATIENT
Start: 2024-09-26 | End: 2024-09-29

## 2024-09-26 RX ORDER — SODIUM CHLORIDE IRRIG SOLUTION 0.9 %
2000 SOLUTION, IRRIGATION IRRIGATION ONCE
Refills: 0 | Status: COMPLETED | OUTPATIENT
Start: 2024-09-26 | End: 2024-09-26

## 2024-09-26 RX ORDER — CHLORDIAZEPOXIDE HCL 10 MG
25 CAPSULE ORAL ONCE
Refills: 0 | Status: DISCONTINUED | OUTPATIENT
Start: 2024-09-26 | End: 2024-09-26

## 2024-09-26 RX ORDER — SODIUM CHLORIDE 0.9 % (FLUSH) 0.9 %
1000 SYRINGE (ML) INJECTION ONCE
Refills: 0 | Status: COMPLETED | OUTPATIENT
Start: 2024-09-26 | End: 2024-09-26

## 2024-09-26 RX ORDER — PHENOBARBITAL 100 MG/1
160 TABLET ORAL ONCE
Refills: 0 | Status: DISCONTINUED | OUTPATIENT
Start: 2024-09-26 | End: 2024-09-26

## 2024-09-26 RX ORDER — THIAMINE HYDROCHLORIDE 100 MG/ML
100 INJECTION, SOLUTION INTRAMUSCULAR; INTRAVENOUS ONCE
Refills: 0 | Status: DISCONTINUED | OUTPATIENT
Start: 2024-09-26 | End: 2024-09-26

## 2024-09-26 RX ORDER — MAGNESIUM SULFATE 500 MG/ML
2 VIAL (ML) INJECTION ONCE
Refills: 0 | Status: COMPLETED | OUTPATIENT
Start: 2024-09-26 | End: 2024-09-26

## 2024-09-26 RX ORDER — FOLIC ACID 1 MG/1
1 TABLET ORAL DAILY
Refills: 0 | Status: DISCONTINUED | OUTPATIENT
Start: 2024-09-26 | End: 2024-09-29

## 2024-09-26 RX ORDER — THIAMINE HYDROCHLORIDE 100 MG/ML
100 INJECTION, SOLUTION INTRAMUSCULAR; INTRAVENOUS ONCE
Refills: 0 | Status: COMPLETED | OUTPATIENT
Start: 2024-09-26 | End: 2024-09-26

## 2024-09-26 RX ORDER — THIAMINE HYDROCHLORIDE 100 MG/ML
100 INJECTION, SOLUTION INTRAMUSCULAR; INTRAVENOUS DAILY
Refills: 0 | Status: DISCONTINUED | OUTPATIENT
Start: 2024-09-26 | End: 2024-09-26

## 2024-09-26 RX ORDER — SODIUM CHLORIDE IRRIG SOLUTION 0.9 %
1000 SOLUTION, IRRIGATION IRRIGATION
Refills: 0 | Status: DISCONTINUED | OUTPATIENT
Start: 2024-09-26 | End: 2024-09-26

## 2024-09-26 RX ADMIN — MULTI VITAMIN/MINERAL SUPPLEMENT WITH ASCORBIC ACID, NIACIN, PYRIDOXINE, PANTOTHENIC ACID, FOLIC ACID, RIBOFLAVIN, THIAMIN, BIOTIN, COBALAMIN AND ZINC. 1 TABLET(S): 60; 20; 12.5; 10; 10; 1.7; 1.5; 1; .3; .006 TABLET, COATED ORAL at 21:09

## 2024-09-26 RX ADMIN — Medication 40 MILLIEQUIVALENT(S): at 23:32

## 2024-09-26 RX ADMIN — Medication 1 MILLIGRAM(S): at 21:11

## 2024-09-26 RX ADMIN — THIAMINE HYDROCHLORIDE 100 MILLIGRAM(S): 100 INJECTION, SOLUTION INTRAMUSCULAR; INTRAVENOUS at 21:31

## 2024-09-26 RX ADMIN — Medication 1000 MILLILITER(S): at 21:04

## 2024-09-26 RX ADMIN — FOLIC ACID 1 MILLIGRAM(S): 1 TABLET ORAL at 21:09

## 2024-09-26 RX ADMIN — Medication 25 GRAM(S): at 23:36

## 2024-09-26 RX ADMIN — Medication 25 MILLIGRAM(S): at 21:09

## 2024-09-26 RX ADMIN — Medication 2000 MILLILITER(S): at 23:37

## 2024-09-26 NOTE — ED ADULT TRIAGE NOTE - CHIEF COMPLAINT QUOTE
Pt walked in c/o alcohol withdrawal. Endorsing BUE tremors and difficulty having thoughts. Hx of chronic ETOH use, states normally drinks 2 pints a day, last drink at 5pm.

## 2024-09-26 NOTE — ED ADULT NURSE NOTE - EXTENSIONS OF SELF_ADULT
What Is The Reason For Today's Visit?: Annual Full Body Skin Examination with No Concerns What Is The Reason For Today's Visit? (Being Monitored For X): concerning skin lesions on a periodic basis How Severe Are Your Spot(S)?: mild None

## 2024-09-26 NOTE — ED PROVIDER NOTE - CLINICAL SUMMARY MEDICAL DECISION MAKING FREE TEXT BOX
Alcohol withdrawal.  Patient takes Naltrexone and Wellbutrin.  Tremors, nausea, sweats.  CIWA 15 on arrival.  No cough or CP or SOB.  No external signs of trauma.  Patient is unkempt.  Last alcohol ingestion today.    CIWA went from 15 to 8 to 5  Case d/w Dr Flores ICU attending admit to monitored bed and give magnesium 2g IV, LR 2L, potassium 40mEq po, phenobarb 160mg IV

## 2024-09-26 NOTE — ED PROVIDER NOTE - OBJECTIVE STATEMENT
Alcohol withdrawal.  Patient takes Naltrexone and Wellbutrin.  Tremors, nausea, sweats.  CIWA 15 on arrival.  No cough or CP or SOB.  No external signs of trauma.  Patient is unkempt.  Last alcohol ingestion today.

## 2024-09-26 NOTE — ED PROVIDER NOTE - NS ED ATTENDING STATEMENT MOD
REF#[id number]-MVDR. ANITHA TEJADA-99 VISITS, EXPIRES 12/31/21    [facility] Specialist-no referral required, order in chart, pt informed by [id number]-[email or web address]   Attending Only

## 2024-09-26 NOTE — ED ADULT NURSE NOTE - OBJECTIVE STATEMENT
Pt is a 59y male c/o alcohol withdrawal. reports drinks 2 pints of vodka daily, last drink at 5pm. Arrives endorsing tremors, mild nausea, "hearing something", body tingling/numbness, and "can't get my thoughts straight. Pt AAOx4, speaking in complete sentences. CIWA 8 on exam.

## 2024-09-26 NOTE — ED ADULT NURSE NOTE - CHPI ED NUR SYMPTOMS NEG
no abdominal distension/no abdominal pain/no chills/no confusion/no disorientation/no fever/no pain/no vomiting

## 2024-09-26 NOTE — ED ADULT NURSE REASSESSMENT NOTE - NS ED NURSE REASSESS COMMENT FT1
Repeat CIWA 2, pt reports mild nausea and anxiety, when asked what anxious about states "I don't know just something you feel". Reports relief of tremors and negative upon visual exam, denies hearing "static noise" he heard earlier, denies tingling, denies seeing light flashes. Confirmed w/ MD Hormozdi administration of magnesium and potassium despite normal lab values, clinical indication explained and confirmed. Care on-going.

## 2024-09-27 LAB
ADD ON TEST-SPECIMEN IN LAB: SIGNIFICANT CHANGE UP
ALBUMIN SERPL ELPH-MCNC: 3.7 G/DL — SIGNIFICANT CHANGE UP (ref 3.3–5)
ALP SERPL-CCNC: 82 U/L — SIGNIFICANT CHANGE UP (ref 40–120)
ALT FLD-CCNC: 38 U/L — SIGNIFICANT CHANGE UP (ref 10–45)
ANION GAP SERPL CALC-SCNC: 8 MMOL/L — SIGNIFICANT CHANGE UP (ref 5–17)
ANION GAP SERPL CALC-SCNC: 9 MMOL/L — SIGNIFICANT CHANGE UP (ref 9–16)
AST SERPL-CCNC: 50 U/L — HIGH (ref 10–40)
BASOPHILS # BLD AUTO: 0.13 K/UL — SIGNIFICANT CHANGE UP (ref 0–0.2)
BASOPHILS NFR BLD AUTO: 1.7 % — SIGNIFICANT CHANGE UP (ref 0–2)
BILIRUB SERPL-MCNC: 0.9 MG/DL — SIGNIFICANT CHANGE UP (ref 0.2–1.2)
BLD GP AB SCN SERPL QL: NEGATIVE — SIGNIFICANT CHANGE UP
BUN SERPL-MCNC: 12 MG/DL — SIGNIFICANT CHANGE UP (ref 7–23)
BUN SERPL-MCNC: 15 MG/DL — SIGNIFICANT CHANGE UP (ref 7–23)
CALCIUM SERPL-MCNC: 7.7 MG/DL — LOW (ref 8.5–10.5)
CALCIUM SERPL-MCNC: 8.3 MG/DL — LOW (ref 8.4–10.5)
CHLORIDE SERPL-SCNC: 106 MMOL/L — SIGNIFICANT CHANGE UP (ref 96–108)
CHLORIDE SERPL-SCNC: 109 MMOL/L — HIGH (ref 96–108)
CO2 SERPL-SCNC: 27 MMOL/L — SIGNIFICANT CHANGE UP (ref 22–31)
CO2 SERPL-SCNC: 28 MMOL/L — SIGNIFICANT CHANGE UP (ref 22–31)
CREAT SERPL-MCNC: 0.68 MG/DL — SIGNIFICANT CHANGE UP (ref 0.5–1.3)
CREAT SERPL-MCNC: 0.79 MG/DL — SIGNIFICANT CHANGE UP (ref 0.5–1.3)
EGFR: 103 ML/MIN/1.73M2 — SIGNIFICANT CHANGE UP
EGFR: 108 ML/MIN/1.73M2 — SIGNIFICANT CHANGE UP
EOSINOPHIL # BLD AUTO: 0.26 K/UL — SIGNIFICANT CHANGE UP (ref 0–0.5)
EOSINOPHIL NFR BLD AUTO: 3.4 % — SIGNIFICANT CHANGE UP (ref 0–6)
GLUCOSE SERPL-MCNC: 77 MG/DL — SIGNIFICANT CHANGE UP (ref 70–99)
GLUCOSE SERPL-MCNC: 85 MG/DL — SIGNIFICANT CHANGE UP (ref 70–99)
HCT VFR BLD CALC: 37 % — LOW (ref 39–50)
HGB BLD-MCNC: 12.5 G/DL — LOW (ref 13–17)
IMM GRANULOCYTES NFR BLD AUTO: 0.5 % — SIGNIFICANT CHANGE UP (ref 0–0.9)
LYMPHOCYTES # BLD AUTO: 1.5 K/UL — SIGNIFICANT CHANGE UP (ref 1–3.3)
LYMPHOCYTES # BLD AUTO: 19.7 % — SIGNIFICANT CHANGE UP (ref 13–44)
MAGNESIUM SERPL-MCNC: 2 MG/DL — SIGNIFICANT CHANGE UP (ref 1.6–2.6)
MAGNESIUM SERPL-MCNC: 2 MG/DL — SIGNIFICANT CHANGE UP (ref 1.6–2.6)
MCHC RBC-ENTMCNC: 30.2 PG — SIGNIFICANT CHANGE UP (ref 27–34)
MCHC RBC-ENTMCNC: 33.8 GM/DL — SIGNIFICANT CHANGE UP (ref 32–36)
MCV RBC AUTO: 89.4 FL — SIGNIFICANT CHANGE UP (ref 80–100)
MONOCYTES # BLD AUTO: 0.66 K/UL — SIGNIFICANT CHANGE UP (ref 0–0.9)
MONOCYTES NFR BLD AUTO: 8.7 % — SIGNIFICANT CHANGE UP (ref 2–14)
NEUTROPHILS # BLD AUTO: 5.04 K/UL — SIGNIFICANT CHANGE UP (ref 1.8–7.4)
NEUTROPHILS NFR BLD AUTO: 66 % — SIGNIFICANT CHANGE UP (ref 43–77)
NRBC # BLD: 0 /100 WBCS — SIGNIFICANT CHANGE UP (ref 0–0)
PHENOBARB SERPL-MCNC: 24.1 UG/ML — SIGNIFICANT CHANGE UP (ref 15–40)
PHOSPHATE SERPL-MCNC: 3.6 MG/DL — SIGNIFICANT CHANGE UP (ref 2.5–4.5)
PHOSPHATE SERPL-MCNC: 3.7 MG/DL — SIGNIFICANT CHANGE UP (ref 2.5–4.5)
PLATELET # BLD AUTO: 202 K/UL — SIGNIFICANT CHANGE UP (ref 150–400)
POTASSIUM SERPL-MCNC: 4.2 MMOL/L — SIGNIFICANT CHANGE UP (ref 3.5–5.3)
POTASSIUM SERPL-MCNC: 4.4 MMOL/L — SIGNIFICANT CHANGE UP (ref 3.5–5.3)
POTASSIUM SERPL-SCNC: 4.2 MMOL/L — SIGNIFICANT CHANGE UP (ref 3.5–5.3)
POTASSIUM SERPL-SCNC: 4.4 MMOL/L — SIGNIFICANT CHANGE UP (ref 3.5–5.3)
PROT SERPL-MCNC: 6 G/DL — SIGNIFICANT CHANGE UP (ref 6–8.3)
RBC # BLD: 4.14 M/UL — LOW (ref 4.2–5.8)
RBC # FLD: 13.3 % — SIGNIFICANT CHANGE UP (ref 10.3–14.5)
RH IG SCN BLD-IMP: POSITIVE — SIGNIFICANT CHANGE UP
SODIUM SERPL-SCNC: 141 MMOL/L — SIGNIFICANT CHANGE UP (ref 135–145)
SODIUM SERPL-SCNC: 146 MMOL/L — HIGH (ref 132–145)
TSH SERPL-MCNC: 1.38 UIU/ML — SIGNIFICANT CHANGE UP (ref 0.27–4.2)
WBC # BLD: 7.63 K/UL — SIGNIFICANT CHANGE UP (ref 3.8–10.5)
WBC # FLD AUTO: 7.63 K/UL — SIGNIFICANT CHANGE UP (ref 3.8–10.5)

## 2024-09-27 PROCEDURE — 99222 1ST HOSP IP/OBS MODERATE 55: CPT | Mod: GC

## 2024-09-27 PROCEDURE — 99222 1ST HOSP IP/OBS MODERATE 55: CPT

## 2024-09-27 RX ORDER — THIAMINE HYDROCHLORIDE 100 MG/ML
500 INJECTION, SOLUTION INTRAMUSCULAR; INTRAVENOUS DAILY
Refills: 0 | Status: DISCONTINUED | OUTPATIENT
Start: 2024-09-27 | End: 2024-09-27

## 2024-09-27 RX ORDER — PHENOBARBITAL 100 MG/1
390 TABLET ORAL ONCE
Refills: 0 | Status: DISCONTINUED | OUTPATIENT
Start: 2024-09-27 | End: 2024-09-27

## 2024-09-27 RX ORDER — THIAMINE HYDROCHLORIDE 100 MG/ML
500 INJECTION, SOLUTION INTRAMUSCULAR; INTRAVENOUS EVERY 8 HOURS
Refills: 0 | Status: DISCONTINUED | OUTPATIENT
Start: 2024-09-27 | End: 2024-09-29

## 2024-09-27 RX ORDER — PHENOBARBITAL 100 MG/1
130 TABLET ORAL
Refills: 0 | Status: DISCONTINUED | OUTPATIENT
Start: 2024-09-27 | End: 2024-09-27

## 2024-09-27 RX ORDER — SODIUM CHLORIDE IRRIG SOLUTION 0.9 %
1000 SOLUTION, IRRIGATION IRRIGATION ONCE
Refills: 0 | Status: DISCONTINUED | OUTPATIENT
Start: 2024-09-27 | End: 2024-09-27

## 2024-09-27 RX ORDER — PHENOBARBITAL 100 MG/1
520 TABLET ORAL ONCE
Refills: 0 | Status: DISCONTINUED | OUTPATIENT
Start: 2024-09-27 | End: 2024-09-27

## 2024-09-27 RX ORDER — INFLUENZA VIRUS VACCINE 15; 15; 15; 15 UG/.5ML; UG/.5ML; UG/.5ML; UG/.5ML
0.5 SUSPENSION INTRAMUSCULAR ONCE
Refills: 0 | Status: DISCONTINUED | OUTPATIENT
Start: 2024-09-27 | End: 2024-09-29

## 2024-09-27 RX ORDER — PHENOBARBITAL 100 MG/1
650 TABLET ORAL ONCE
Refills: 0 | Status: DISCONTINUED | OUTPATIENT
Start: 2024-09-27 | End: 2024-09-27

## 2024-09-27 RX ORDER — ENOXAPARIN SODIUM 150 MG/ML
40 INJECTION SUBCUTANEOUS EVERY 24 HOURS
Refills: 0 | Status: DISCONTINUED | OUTPATIENT
Start: 2024-09-27 | End: 2024-09-29

## 2024-09-27 RX ORDER — PHENOBARBITAL 100 MG/1
130 TABLET ORAL
Refills: 0 | Status: DISCONTINUED | OUTPATIENT
Start: 2024-09-27 | End: 2024-09-29

## 2024-09-27 RX ADMIN — PHENOBARBITAL 105 MILLIGRAM(S): 100 TABLET ORAL at 04:33

## 2024-09-27 RX ADMIN — Medication 1 MILLIGRAM(S): at 02:50

## 2024-09-27 RX ADMIN — PHENOBARBITAL 416 MILLIGRAM(S): 100 TABLET ORAL at 07:48

## 2024-09-27 RX ADMIN — FOLIC ACID 1 MILLIGRAM(S): 1 TABLET ORAL at 10:14

## 2024-09-27 RX ADMIN — MULTI VITAMIN/MINERAL SUPPLEMENT WITH ASCORBIC ACID, NIACIN, PYRIDOXINE, PANTOTHENIC ACID, FOLIC ACID, RIBOFLAVIN, THIAMIN, BIOTIN, COBALAMIN AND ZINC. 1 TABLET(S): 60; 20; 12.5; 10; 10; 1.7; 1.5; 1; .3; .006 TABLET, COATED ORAL at 10:14

## 2024-09-27 RX ADMIN — PHENOBARBITAL 103 MILLIGRAM(S): 100 TABLET ORAL at 11:52

## 2024-09-27 RX ADMIN — THIAMINE HYDROCHLORIDE 105 MILLIGRAM(S): 100 INJECTION, SOLUTION INTRAMUSCULAR; INTRAVENOUS at 10:31

## 2024-09-27 RX ADMIN — ENOXAPARIN SODIUM 40 MILLIGRAM(S): 150 INJECTION SUBCUTANEOUS at 18:06

## 2024-09-27 RX ADMIN — THIAMINE HYDROCHLORIDE 105 MILLIGRAM(S): 100 INJECTION, SOLUTION INTRAMUSCULAR; INTRAVENOUS at 18:06

## 2024-09-27 NOTE — PATIENT PROFILE ADULT - FALL HARM RISK - HARM RISK INTERVENTIONS
Assistance with ambulation/Assistance OOB with selected safe patient handling equipment/Communicate Risk of Fall with Harm to all staff/Monitor for mental status changes/Monitor gait and stability/Reinforce activity limits and safety measures with patient and family/Tailored Fall Risk Interventions/Toileting schedule using arm’s reach rule for commode and bathroom/Use of alarms - bed, chair and/or voice tab/Visual Cue: Yellow wristband and red socks/Bed in lowest position, wheels locked, appropriate side rails in place/Call bell, personal items and telephone in reach/Instruct patient to call for assistance before getting out of bed or chair/Non-slip footwear when patient is out of bed/[place] to call system/Physically safe environment - no spills, clutter or unnecessary equipment/Purposeful Proactive Rounding/Room/bathroom lighting operational, light cord in reach

## 2024-09-27 NOTE — BH CONSULTATION LIAISON ASSESSMENT NOTE - CURRENT MEDICATION
MEDICATIONS  (STANDING):  enoxaparin Injectable 40 milliGRAM(s) SubCutaneous every 24 hours  folic acid 1 milliGRAM(s) Oral daily  influenza   Vaccine 0.5 milliLiter(s) IntraMuscular once  multivitamin 1 Tablet(s) Oral daily  thiamine IVPB 500 milliGRAM(s) IV Intermittent every 8 hours    MEDICATIONS  (PRN):  PHENobarbital Injectable 130 milliGRAM(s) IV Push every 15 minutes PRN RASS>3 or CIWA > 7

## 2024-09-27 NOTE — PROGRESS NOTE ADULT - SUBJECTIVE AND OBJECTIVE BOX
**INCOMPLETE NOTE    INTERVAL/OVERNIGHT EVENTS: None    SUBJECTIVE:  Patient seen and examined at bedside, comfortable, NAD. Denied fever, chest pain, dyspnea, abdominal pain.     Vital Signs Last 12 Hrs  T(F): 98.5 (09-27-24 @ 06:45), Max: 101.1 (09-27-24 @ 03:56)  HR: 82 (09-27-24 @ 03:39) (70 - 115)  BP: 129/62 (09-27-24 @ 03:39) (108/58 - 149/92)  BP(mean): 88 (09-27-24 @ 03:39) (88 - 88)  RR: 18 (09-27-24 @ 03:39) (16 - 18)  SpO2: 97% (09-27-24 @ 03:39) (94% - 97%)  I&O's Summary      PHYSICAL EXAM:  General: NAD  HEENT: PERRL, EOM intact, sclera anicteric, MMM  Cardiovascular: RRR; no MRG; no JVD  Respiratory: CTAB; no WRR  GI/: soft; NTND; BS x4  Extremities: WWP; 2+ peripheral pulses bilaterally; no LE edema  Skin: normal color & turgor; no rash  Neurologic: aox3; no focal deficits    LABS:    09-27    146[H]  |  109[H]  |  12  ----------------------------<  77  4.4   |  28  |  0.68    Ca    7.7[L]      27 Sep 2024 00:01  Phos  3.7     09-27  Mg     2.0     09-27    TPro  7.0  /  Alb  3.7  /  TBili  0.6  /  DBili  0.2  /  AST  58[H]  /  ALT  56[H]  /  AlkPhos  88  09-26      Urinalysis Basic - ( 27 Sep 2024 00:01 )    Color: x / Appearance: x / SG: x / pH: x  Gluc: 77 mg/dL / Ketone: x  / Bili: x / Urobili: x   Blood: x / Protein: x / Nitrite: x   Leuk Esterase: x / RBC: x / WBC x   Sq Epi: x / Non Sq Epi: x / Bacteria: x          RADIOLOGY & ADDITIONAL TESTS:    MEDICATIONS  (STANDING):  folic acid 1 milliGRAM(s) Oral daily  multivitamin 1 Tablet(s) Oral daily  PHENobarbital IVPB 520 milliGRAM(s) IV Intermittent once  thiamine IVPB 500 milliGRAM(s) IV Intermittent daily    MEDICATIONS  (PRN):  PHENobarbital Injectable 130 milliGRAM(s) IV Push every 15 minutes PRN RASS>3 or CIWA > 7   INTERVAL/OVERNIGHT EVENTS: None    SUBJECTIVE:  Patient seen and examined at bedside, comfortable, NAD. Denied fever, chest pain, dyspnea, abdominal pain.     Vital Signs Last 12 Hrs  T(F): 98.5 (09-27-24 @ 06:45), Max: 101.1 (09-27-24 @ 03:56)  HR: 82 (09-27-24 @ 03:39) (70 - 115)  BP: 129/62 (09-27-24 @ 03:39) (108/58 - 149/92)  BP(mean): 88 (09-27-24 @ 03:39) (88 - 88)  RR: 18 (09-27-24 @ 03:39) (16 - 18)  SpO2: 97% (09-27-24 @ 03:39) (94% - 97%)  I&O's Summary      PHYSICAL EXAM:  General: NAD, CIWA 12 at time of exam   HEENT: PERRL, EOM intact, sclera anicteric, MMM  Cardiovascular: RRR; no MRG; no JVD  Respiratory: CTAB; no WRR  GI/: soft; NTND; BS x4  Extremities: WWP; 2+ peripheral pulses bilaterally; no LE edema; tremors on outstretched hands  Skin: normal color & turgor; no rash  Neurologic: aox3; no focal deficits    LABS:    09-27    146[H]  |  109[H]  |  12  ----------------------------<  77  4.4   |  28  |  0.68    Ca    7.7[L]      27 Sep 2024 00:01  Phos  3.7     09-27  Mg     2.0     09-27    TPro  7.0  /  Alb  3.7  /  TBili  0.6  /  DBili  0.2  /  AST  58[H]  /  ALT  56[H]  /  AlkPhos  88  09-26      Urinalysis Basic - ( 27 Sep 2024 00:01 )    Color: x / Appearance: x / SG: x / pH: x  Gluc: 77 mg/dL / Ketone: x  / Bili: x / Urobili: x   Blood: x / Protein: x / Nitrite: x   Leuk Esterase: x / RBC: x / WBC x   Sq Epi: x / Non Sq Epi: x / Bacteria: x          RADIOLOGY & ADDITIONAL TESTS:    MEDICATIONS  (STANDING):  folic acid 1 milliGRAM(s) Oral daily  multivitamin 1 Tablet(s) Oral daily  PHENobarbital IVPB 520 milliGRAM(s) IV Intermittent once  thiamine IVPB 500 milliGRAM(s) IV Intermittent daily    MEDICATIONS  (PRN):  PHENobarbital Injectable 130 milliGRAM(s) IV Push every 15 minutes PRN RASS>3 or CIWA > 7   INTERVAL/OVERNIGHT EVENTS: None    SUBJECTIVE:  Patient seen and examined at bedside, comfortable, feeling "dried out" and drowsy. Denied fever, chest pain, dyspnea, abdominal pain.     Vital Signs Last 12 Hrs  T(F): 98.5 (09-27-24 @ 06:45), Max: 101.1 (09-27-24 @ 03:56)  HR: 82 (09-27-24 @ 03:39) (70 - 115)  BP: 129/62 (09-27-24 @ 03:39) (108/58 - 149/92)  BP(mean): 88 (09-27-24 @ 03:39) (88 - 88)  RR: 18 (09-27-24 @ 03:39) (16 - 18)  SpO2: 97% (09-27-24 @ 03:39) (94% - 97%)  I&O's Summary      PHYSICAL EXAM:  General: NAD, CIWA 12 at time of exam   HEENT: PERRL, EOM intact, sclera anicteric, MMM  Cardiovascular: RRR; no MRG; no JVD  Respiratory: CTAB; no WRR  GI/: soft; NTND; BS x4  Extremities: WWP; 2+ peripheral pulses bilaterally; no LE edema; tremors on outstretched hands  Skin: normal color & turgor; no rash  Neurologic: aox3; no focal deficits    LABS:    09-27    146[H]  |  109[H]  |  12  ----------------------------<  77  4.4   |  28  |  0.68    Ca    7.7[L]      27 Sep 2024 00:01  Phos  3.7     09-27  Mg     2.0     09-27    TPro  7.0  /  Alb  3.7  /  TBili  0.6  /  DBili  0.2  /  AST  58[H]  /  ALT  56[H]  /  AlkPhos  88  09-26      Urinalysis Basic - ( 27 Sep 2024 00:01 )    Color: x / Appearance: x / SG: x / pH: x  Gluc: 77 mg/dL / Ketone: x  / Bili: x / Urobili: x   Blood: x / Protein: x / Nitrite: x   Leuk Esterase: x / RBC: x / WBC x   Sq Epi: x / Non Sq Epi: x / Bacteria: x          RADIOLOGY & ADDITIONAL TESTS:    MEDICATIONS  (STANDING):  folic acid 1 milliGRAM(s) Oral daily  multivitamin 1 Tablet(s) Oral daily  PHENobarbital IVPB 520 milliGRAM(s) IV Intermittent once  thiamine IVPB 500 milliGRAM(s) IV Intermittent daily    MEDICATIONS  (PRN):  PHENobarbital Injectable 130 milliGRAM(s) IV Push every 15 minutes PRN RASS>3 or CIWA > 7

## 2024-09-27 NOTE — H&P ADULT - NSHPLABSRESULTS_GEN_ALL_CORE
09-27    146[H]  |  109[H]  |  12  ----------------------------<  77  4.4   |  28  |  0.68    Ca    7.7[L]      27 Sep 2024 00:01  Phos  3.7     09-27  Mg     2.0     09-27    TPro  7.0  /  Alb  3.7  /  TBili  0.6  /  DBili  0.2  /  AST  58[H]  /  ALT  56[H]  /  AlkPhos  88  09-26         CAPILLARY BLOOD GLUCOSE      POCT Blood Glucose.: 110 mg/dL (26 Sep 2024 20:26)                     Lactate Trend      Urinalysis Basic - ( 27 Sep 2024 00:01 )    Color: x / Appearance: x / SG: x / pH: x  Gluc: 77 mg/dL / Ketone: x  / Bili: x / Urobili: x   Blood: x / Protein: x / Nitrite: x   Leuk Esterase: x / RBC: x / WBC x   Sq Epi: x / Non Sq Epi: x / Bacteria: x              RADIOLOGY, EKG & ADDITIONAL TESTS: Reviewed.

## 2024-09-27 NOTE — H&P ADULT - NSHPPHYSICALEXAM_GEN_ALL_CORE
.  VITAL SIGNS:  T(C): 38.4 (09-27-24 @ 03:56), Max: 38.4 (09-27-24 @ 03:56)  T(F): 101.1 (09-27-24 @ 03:56), Max: 101.1 (09-27-24 @ 03:56)  HR: 79 (09-27-24 @ 02:24) (70 - 115)  BP: 117/66 (09-27-24 @ 02:24) (108/58 - 149/92)  BP(mean): --  RR: 16 (09-27-24 @ 02:24) (16 - 18)  SpO2: 96% (09-27-24 @ 02:24) (94% - 96%)  Wt(kg): --    PHYSICAL EXAM:    Constitutional: SWEATY, ANXIOUS   HEENT: NC/AT, PERRL/EOMI, anicteric sclera, No JVD or thyromegaly, MMM  Respiratory: CTA B/L; no audible wheezing/ronchi/rales  Cardiac: +S1/S2; RRR; no M/R/G; PMI non-displaced  Gastrointestinal: soft, NT/ND; no rebound or guarding; +BS  Extremities: WWP, no clubbing or cyanosis; no peripheral edema  Vascular: 2+ radial & DP pulses  Dermatologic: skin warm, dry and intact; no rashes, wounds, or scars  Neurologic: AAOx3; moves all 4 extremities spontaneously   Psychiatric: affect and characteristics of appearance, verbalizations, behaviors are appropriate

## 2024-09-27 NOTE — H&P ADULT - ATTENDING COMMENTS
59 M w/ AUD (with history of withdrawl seizures) and MDD p/w tremors, nausea, and sweats, found to be in alcohol withdrawal w/ CIWA 15. Physical exam as above.   1. Alcohol withdrawal   - phenobarbital load  - iv fluids  - potassium  - magnesium   - high dose thiamine

## 2024-09-27 NOTE — H&P ADULT - TIME BILLING
I have spent 58 minutes of time on the encounter which excludes teaching and separately reported services

## 2024-09-27 NOTE — BH CONSULTATION LIAISON ASSESSMENT NOTE - HPI (INCLUDE ILLNESS QUALITY, SEVERITY, DURATION, TIMING, CONTEXT, MODIFYING FACTORS, ASSOCIATED SIGNS AND SYMPTOMS)
Michael Murrieta is a 60 yo male with a PPH of AUD unspecified and MDD, presenting today for management of alcohol withdrawal. Psych consulted for furhter discharge management of alcohol cravings and depression. On assessment today, the patient reports prior to him entering the hospital, he had been about 60 days sober from alcohol while following up with the [facility] where he attended group meetings about 5 days a week, was prescribed Wellbutrin 300 mg that was started at [facility] and continued by the NP that works at the center. He also sees a therapist Devin Juarez once weekly as well. He states that he had several deadlines to make and other important work related responsibilities prior to his drinking. He stated that he was drinking 1-2 pints of vodka daily for the last 5 days which led him to go the [facility] ED. He states that he stopped his Wellbutrin 300 mg on Wednesday due to his concern about seizures. He denies any acute withdrawals at this time, he reports ongoing "seeing patterns" with and without his glasses and also with and without alcohol but does reports a history of alcoholic hallucinosis as well as alcohol withdrawal seizures this past March. She reports that these were unwitnessed seizures and that he believes them to be seizures due to "how characteristic they were to my wife's seizures when she had brain cancer." He mentioned feeling "post-ictal" at the time, however he also reported hitting his head and not remembering too much at the time but does remember getting a brain MRI. He reports he was started on Wellbutrin while at [facility] with no seizures while on this medication. He reports multiple trials of antidepressants including "SSRIs and SNRIs" but states that he discontinued these due to the side effects. He stated that wellbutrin has been helpful with improving his mood. He also reports taking Naltrexone 50 mg daily and stated his preference for an oral formulation of this medication. He declined a trial of Vivitrol. He denied any SI or HI at this time as well. Notably he appeared to have an overall depressed affect, becoming intermittently tearful with slow mildly monotonous speech, and what appears to be a negative self concept.

## 2024-09-27 NOTE — SBIRT NOTE ADULT - NSSBIRTBRIEFINTDET_GEN_A_CORE
SW completed SBIRT with patient, who endorsed daily alcohol use- "a couple of pints of vodka every day" for approximately one week. Patient added that there are times he stops but then starts again. Patient engaged in outpatient services at [facility] and sees a counselor once a week. Patient asked if they not be notified of his admission. SW provided support and offered additional resources but patient declined.

## 2024-09-27 NOTE — PROGRESS NOTE ADULT - ASSESSMENT
59M w/ PMHx of AUD (with history of withdrawl seizures) and MDD p/w tremors, nausea, and sweats, found to be in alcohol withdrawal w/CIWA 15 at [facility] and transferred to [facility] for further management of acute alcohol withdrawal.     NEURO  AOx3 (at baseline)     #Alcohol Withdrawal   #Alcohol dependence   History of alcohol withdrawal seizures. History of many episodes of withdrawl.    Patient states he has MANY drinks per week. Last drink yesterday at 3PM  Current CIWA score of 10.   s/p 2L fluids, Mg and K repletion     Plan:  - Loading dose of phenobarbitaol 20 mg/kg 650 mg -> 520 (3 hours later) -> 390 (3 hours later)  - CIWA Protocol: phenobarbital 130 IV push q15min for 4 doses for CIWA > 7 or RAAS <3  - folic acid 1mg qday and thiamine 500 mg IV qday, MVI daily   - F/up urine drug screen   - F/up EKG    ENDOCRINE   ESPINOZA    CARDIOVASCULAR  ESPINOZA    PULM/RESP  ESPINOZA    GASTROINTESTINAL   ESPINOZA    GENITOURINARY/RENAL  ESPINOZA    HEMATOLOGIC   ESPINOZA    INFECTIOUS DISEASE  ESPINOZA    MUSCULOSKELETAL   ESPINOZA    PSYCHIATRY   #MDD   History of MDD. Home medications: Wellbutrin (unknown dosage Last taken on Wednesday morning  - Formal med recc in AM for dosage     PROPHYLACTIC MEASURE   F: None indicated   E: Replete as needed   N: NPO pending bedside dysphagia  DVT Prophylaxis: None indicated (IMPROVEDD score 0)  Activity as tolerated   Code status: FULL

## 2024-09-27 NOTE — BH CONSULTATION LIAISON ASSESSMENT NOTE - NSBHCHARTREVIEWVS_PSY_A_CORE FT
Vital Signs Last 24 Hrs  T(C): 37.1 (27 Sep 2024 18:11), Max: 38.4 (27 Sep 2024 03:56)  T(F): 98.7 (27 Sep 2024 18:11), Max: 101.1 (27 Sep 2024 03:56)  HR: 64 (27 Sep 2024 16:04) (64 - 89)  BP: 132/77 (27 Sep 2024 16:04) (108/58 - 149/92)  BP(mean): 97 (27 Sep 2024 16:04) (88 - 99)  RR: 18 (27 Sep 2024 16:04) (16 - 18)  SpO2: 97% (27 Sep 2024 16:04) (94% - 98%)    Parameters below as of 27 Sep 2024 16:04  Patient On (Oxygen Delivery Method): room air

## 2024-09-27 NOTE — ED ADULT NURSE REASSESSMENT NOTE - NS ED NURSE REASSESS COMMENT FT1
MD made aware of return of tremors, pt states "they come and go". Ativan administered as per order p/t transfer.

## 2024-09-27 NOTE — H&P ADULT - ASSESSMENT
59M w/ PMHx of AUD (with history of withdrawl seizures) and MDD p/w tremors, nausea, and sweats, found to be in alcohol withdrawal w/CIWA 15 at [facility] and transferred to [facility] for further management of acute alcohol withdrawal.     NEURO  AOx3 (at baseline)     #Alcohol Withdrawal   #Alcohol dependence   History of alcohol withdrawal seizures. History of many episodes of withdrawl.    Patient states he has MANY drinks per week. Last drink yesterday at 3PM  Current CIWA score of 10.   s/p 2L fluids, Mg and K repletion     Plan:  - Loading dose of phenobarbitaol 20 mg/kg 650 mg -> 520 (3 hours later) -> 390 (3 hours later)  - CIWA Protocol: phenobarbital 130 IV push q15min for 4 doses for CIWA > 7 or RAAS <3  - folic acid 1mg qday and thiamine 500 mg IV qday, MVI daily   - F/up urine drug screen   - F/up EKG    ENDOCRINE   ESPINOZA    CARDIOVASCULAR  ESPINOZA    PULM/RESP  ESPINOZA    GASTROINTESTINAL   ESPINOZA    GENITOURINARY/RENAL  ESPINOZA    HEMATOLOGIC   ESPINOZA    INFECTIOUS DISEASE  ESPINOZA    MUSCULOSKELETAL   ESPINOZA    PSYCHIATRY   #MDD   History of MDD. Home medications: Wellbutrin (unknown dosage Last taken on Wednesday morning  - Formal med recc in AM for dosage     PROPHYLACTIC MEASURE   F: None indicated   E: Replete as needed   N: NPO pending bedside dysphagia  Activity as tolerated   Code status: FULL          59M w/ PMHx of AUD (with history of withdrawl seizures) and MDD p/w tremors, nausea, and sweats, found to be in alcohol withdrawal w/CIWA 15 at [facility] and transferred to [facility] for further management of acute alcohol withdrawal.     NEURO  AOx3 (at baseline)     #Alcohol Withdrawal   #Alcohol dependence   History of alcohol withdrawal seizures. History of many episodes of withdrawl.    Patient states he has MANY drinks per week. Last drink yesterday at 3PM  Current CIWA score of 10.   s/p 2L fluids, Mg and K repletion     Plan:  - Loading dose of phenobarbitaol 20 mg/kg 650 mg -> 520 (3 hours later) -> 390 (3 hours later)  - CIWA Protocol: phenobarbital 130 IV push q15min for 4 doses for CIWA > 7 or RAAS <3  - folic acid 1mg qday and thiamine 500 mg IV qday, MVI daily   - F/up urine drug screen   - F/up EKG    ENDOCRINE   ESPINOZA    CARDIOVASCULAR  ESPINOZA    PULM/RESP  ESPINOZA    GASTROINTESTINAL   ESPINOZA    GENITOURINARY/RENAL  ESPINOZA    HEMATOLOGIC   ESPINOZA    INFECTIOUS DISEASE  ESPINOZA    MUSCULOSKELETAL   ESPINOZA    PSYCHIATRY   #MDD   History of MDD. Home medications: Wellbutrin (unknown dosage Last taken on Wednesday morning  - Formal med recc in AM for dosage     PROPHYLACTIC MEASURE   F: None indicated   E: Replete as needed   N: NPO pending bedside dysphagia  DVT Prophylaxis: None indicated (IMPROVEDD score 0)  Activity as tolerated   Code status: FULL

## 2024-09-27 NOTE — BH CONSULTATION LIAISON ASSESSMENT NOTE - OTHER
not assessed  self deprecating  some memory loss during periods of drinking  reports chronically "seeing patterns"  domiciled with a friend

## 2024-09-27 NOTE — ED ADULT NURSE REASSESSMENT NOTE - NS ED NURSE REASSESS COMMENT FT1
MD made aware of CIWA score. Reports will re-evaluate patient p/t phenobarbital administration. Phenobarbital infusion held at this time.

## 2024-09-27 NOTE — PATIENT PROFILE ADULT - FUNCTIONAL ASSESSMENT - BASIC MOBILITY SCORE.
20

## 2024-09-27 NOTE — BH CONSULTATION LIAISON ASSESSMENT NOTE - DETAILS
reports losing a large portion of prior work colleagues on 9/11, loss of other family members.  weight gain, sexual side effects

## 2024-09-27 NOTE — BH CONSULTATION LIAISON ASSESSMENT NOTE - RISK ASSESSMENT
The patient denies any SI or HI with no history of SA, he does have a history of moderate to severe AUD which increases his overall risk, however, he is currently in regular outpatient treatment and groups 5 days a week with significant periods of sobriety that mitigates this risk. Psychiatric hospitalization would not mitigate current chronic risks best managed outpatient. He is not considered an acute risk at this time and can safely follow up outpatient.

## 2024-09-27 NOTE — ED ADULT NURSE REASSESSMENT NOTE - NS ED NURSE REASSESS COMMENT FT1
Received patient in stretcher, eyes closed, breathing with ease on room air. Responsive to verbal stimuli, denies any complaints at this time. Pt shows no signs of tremors or withdrawal symptoms. Obtained orders from MD Red to hold off on phenobarbital unless patient has (-) change in condition.

## 2024-09-27 NOTE — PATIENT PROFILE ADULT - NSPROHMSYMPCOND_GEN_A_NUR
none Glycopyrrolate Counseling:  I discussed with the patient the risks of glycopyrrolate including but not limited to skin rash, drowsiness, dry mouth, difficulty urinating, and blurred vision.

## 2024-09-27 NOTE — BH CONSULTATION LIAISON ASSESSMENT NOTE - NSBHSAALC_PSY_A_CORE FT
first use at 14 yos, heavy use starting in early 30s after 9/11. last use for 5 days 1-2 pints of bodka daily

## 2024-09-27 NOTE — H&P ADULT - HISTORY OF PRESENT ILLNESS
59M w/ PMHx of AUD and MDD p/w tremors, nausea, and sweats, found to be in alcohol withdrawl w/CIWA 15 at [facility] and transferred to [facility]. History of many episodes of presenting to the ED for alcohol intoxication. Has experienced withdrawl seizures in the distant past. Admits to drinking lots of alcohol earlier today. Denies drug usage, headstrike or trauma. On assessment, he is able to speak in complete full sentences.  No cough or CP or SOB.  No external signs of trauma. Last alcohol ingestion today ~3pm.    ED Course:  Vitals: 98.3F, 115HR , /91, RR 18, 96% on RA   Labs: Na 144-146, K 4.4, Cl 109, Anion gap negative, gluc 77, Mag 2.0, Phos 3.7, AST 58/ ALT 56, Alcohol level 207  Imaging:   - CT Head: No acute intracranial hemorrhage, acute transcortical infarction, extra-axial fluid collection, or hydrocephalus. No calvarial fracture  - CT Cervical: No acute fracture or malalignment.  EKG: pending  Consults: None  Interventions: Lorazepam 1mg IVP [id number] 25mg, folic acid 1mg, Mag sulfate IVP 2g, MVI, KCl 40 mEq x1 PO, thiamine 100 mg IVP, LR 2000 cc IV bolus, NaCl 0.9% 1000 cc    59M w/ PMHx of AUD and MDD p/w tremors, nausea, and sweats, found to be in alcohol withdrawl w/CIWA 15 at [facility] and transferred to [facility]. History of many episodes of presenting to the ED for alcohol intoxication. Has experienced withdrawl seizures in the distant past. Admits to drinking lots of alcohol earlier today. Denies drug usage, headstrike or trauma. On assessment, he is able to speak in complete full sentences.  No cough or CP or SOB.  No external signs of trauma. Last alcohol ingestion today ~3pm.    ED Course:  Vitals: 98.3F, 115HR , /91, RR 18, 96% on RA   Labs: Na 144-146, K 4.4, Cl 109, Anion gap negative, gluc 77, Mag 2.0, Phos 3.7, AST 58/ ALT 56, Alcohol level 207  Imaging:   - CT Head: No acute intracranial hemorrhage, acute transcortical infarction, extra-axial fluid collection, or hydrocephalus. No calvarial fracture  - CT Cervical: No acute fracture or malalignment.  EKG: pending  Consults: None  Interventions: Lorazepam 1mg IVP x2 Librium 25mg, folic acid 1mg, Mag sulfate IVP 2g, MVI, KCl 40 mEq x1 PO, thiamine 100 mg IVP, LR 2000 cc IV bolus, NaCl 0.9% 1000 cc

## 2024-09-27 NOTE — BH CONSULTATION LIAISON ASSESSMENT NOTE - SUMMARY
Michael Murrieta is a 58 yo male with a PPH of AUD unspecified and MDD, presenting today for management of alcohol withdrawal. Psych consulted for furhter discharge management of alcohol cravings and depression. On assessment today, the patient reports good control of alcohol withdrawal symptoms, with some depressed mood, mild anxiety, and notably poor self esteem. He reports a history of alcohol withdrawal seizures, however unclear due to being unwitnessed, with no seizure activity while on wellbutrin first started at [facility]. He reports recent 60 day sobriety with likely triggers related to "having money" and also upcoming deadlines at work. He reports a desire to restart wellbutrin and also to maintain current dose of naltrexone. He reports regular follow up with a NP, therapist, and regular daily group meetings at the [facility]. HE denies SI or HI at the this time with no acute risk concerns.     RECOMMENDATIONS:  - start Naltrexone 50 mg daily (can increase to 75 mg/100 mg daily depending on tolerability)  - hold wellbutrin XR until he is out of acute alcohol withdrawal and then restart at 150 mg for 2 days with increase to 300 mg after.   - no psychiatric contraindication for discharge.   -

## 2024-09-28 DIAGNOSIS — F10.939 ALCOHOL USE, UNSPECIFIED WITH WITHDRAWAL, UNSPECIFIED: ICD-10-CM

## 2024-09-28 DIAGNOSIS — F32.9 MAJOR DEPRESSIVE DISORDER, SINGLE EPISODE, UNSPECIFIED: ICD-10-CM

## 2024-09-28 DIAGNOSIS — Z29.9 ENCOUNTER FOR PROPHYLACTIC MEASURES, UNSPECIFIED: ICD-10-CM

## 2024-09-28 DIAGNOSIS — R26.81 UNSTEADINESS ON FEET: ICD-10-CM

## 2024-09-28 LAB
ALBUMIN SERPL ELPH-MCNC: 3.6 G/DL — SIGNIFICANT CHANGE UP (ref 3.3–5)
ALP SERPL-CCNC: 87 U/L — SIGNIFICANT CHANGE UP (ref 40–120)
ALT FLD-CCNC: 34 U/L — SIGNIFICANT CHANGE UP (ref 10–45)
ANION GAP SERPL CALC-SCNC: 11 MMOL/L — SIGNIFICANT CHANGE UP (ref 5–17)
AST SERPL-CCNC: 43 U/L — HIGH (ref 10–40)
BASOPHILS # BLD AUTO: 0.12 K/UL — SIGNIFICANT CHANGE UP (ref 0–0.2)
BASOPHILS NFR BLD AUTO: 1.9 % — SIGNIFICANT CHANGE UP (ref 0–2)
BILIRUB SERPL-MCNC: 1.2 MG/DL — SIGNIFICANT CHANGE UP (ref 0.2–1.2)
BUN SERPL-MCNC: 12 MG/DL — SIGNIFICANT CHANGE UP (ref 7–23)
CALCIUM SERPL-MCNC: 8.4 MG/DL — SIGNIFICANT CHANGE UP (ref 8.4–10.5)
CHLORIDE SERPL-SCNC: 101 MMOL/L — SIGNIFICANT CHANGE UP (ref 96–108)
CO2 SERPL-SCNC: 25 MMOL/L — SIGNIFICANT CHANGE UP (ref 22–31)
CREAT SERPL-MCNC: 0.75 MG/DL — SIGNIFICANT CHANGE UP (ref 0.5–1.3)
EGFR: 105 ML/MIN/1.73M2 — SIGNIFICANT CHANGE UP
EOSINOPHIL # BLD AUTO: 0.48 K/UL — SIGNIFICANT CHANGE UP (ref 0–0.5)
EOSINOPHIL NFR BLD AUTO: 7.5 % — HIGH (ref 0–6)
GLUCOSE SERPL-MCNC: 93 MG/DL — SIGNIFICANT CHANGE UP (ref 70–99)
HCT VFR BLD CALC: 37.1 % — LOW (ref 39–50)
HGB BLD-MCNC: 12 G/DL — LOW (ref 13–17)
IMM GRANULOCYTES NFR BLD AUTO: 0.3 % — SIGNIFICANT CHANGE UP (ref 0–0.9)
LYMPHOCYTES # BLD AUTO: 1.68 K/UL — SIGNIFICANT CHANGE UP (ref 1–3.3)
LYMPHOCYTES # BLD AUTO: 26.3 % — SIGNIFICANT CHANGE UP (ref 13–44)
MAGNESIUM SERPL-MCNC: 2 MG/DL — SIGNIFICANT CHANGE UP (ref 1.6–2.6)
MCHC RBC-ENTMCNC: 28.4 PG — SIGNIFICANT CHANGE UP (ref 27–34)
MCHC RBC-ENTMCNC: 32.3 GM/DL — SIGNIFICANT CHANGE UP (ref 32–36)
MCV RBC AUTO: 87.7 FL — SIGNIFICANT CHANGE UP (ref 80–100)
MONOCYTES # BLD AUTO: 0.71 K/UL — SIGNIFICANT CHANGE UP (ref 0–0.9)
MONOCYTES NFR BLD AUTO: 11.1 % — SIGNIFICANT CHANGE UP (ref 2–14)
NEUTROPHILS # BLD AUTO: 3.39 K/UL — SIGNIFICANT CHANGE UP (ref 1.8–7.4)
NEUTROPHILS NFR BLD AUTO: 52.9 % — SIGNIFICANT CHANGE UP (ref 43–77)
NRBC # BLD: 0 /100 WBCS — SIGNIFICANT CHANGE UP (ref 0–0)
PHOSPHATE SERPL-MCNC: 3.3 MG/DL — SIGNIFICANT CHANGE UP (ref 2.5–4.5)
PLATELET # BLD AUTO: 165 K/UL — SIGNIFICANT CHANGE UP (ref 150–400)
POTASSIUM SERPL-MCNC: 3.9 MMOL/L — SIGNIFICANT CHANGE UP (ref 3.5–5.3)
POTASSIUM SERPL-SCNC: 3.9 MMOL/L — SIGNIFICANT CHANGE UP (ref 3.5–5.3)
PROT SERPL-MCNC: 6.1 G/DL — SIGNIFICANT CHANGE UP (ref 6–8.3)
RBC # BLD: 4.23 M/UL — SIGNIFICANT CHANGE UP (ref 4.2–5.8)
RBC # FLD: 13 % — SIGNIFICANT CHANGE UP (ref 10.3–14.5)
SODIUM SERPL-SCNC: 137 MMOL/L — SIGNIFICANT CHANGE UP (ref 135–145)
WBC # BLD: 6.4 K/UL — SIGNIFICANT CHANGE UP (ref 3.8–10.5)
WBC # FLD AUTO: 6.4 K/UL — SIGNIFICANT CHANGE UP (ref 3.8–10.5)

## 2024-09-28 PROCEDURE — 99223 1ST HOSP IP/OBS HIGH 75: CPT | Mod: GC

## 2024-09-28 PROCEDURE — 99233 SBSQ HOSP IP/OBS HIGH 50: CPT | Mod: GC

## 2024-09-28 RX ORDER — NALTREXONE HYDROCHLORIDE 50 MG/1
50 TABLET, FILM COATED ORAL DAILY
Refills: 0 | Status: DISCONTINUED | OUTPATIENT
Start: 2024-09-28 | End: 2024-09-29

## 2024-09-28 RX ORDER — ACETAMINOPHEN 325 MG
650 TABLET ORAL ONCE
Refills: 0 | Status: COMPLETED | OUTPATIENT
Start: 2024-09-28 | End: 2024-09-28

## 2024-09-28 RX ADMIN — FOLIC ACID 1 MILLIGRAM(S): 1 TABLET ORAL at 11:15

## 2024-09-28 RX ADMIN — Medication 650 MILLIGRAM(S): at 17:06

## 2024-09-28 RX ADMIN — ENOXAPARIN SODIUM 40 MILLIGRAM(S): 150 INJECTION SUBCUTANEOUS at 18:24

## 2024-09-28 RX ADMIN — NALTREXONE HYDROCHLORIDE 50 MILLIGRAM(S): 50 TABLET, FILM COATED ORAL at 11:15

## 2024-09-28 RX ADMIN — THIAMINE HYDROCHLORIDE 105 MILLIGRAM(S): 100 INJECTION, SOLUTION INTRAMUSCULAR; INTRAVENOUS at 11:01

## 2024-09-28 RX ADMIN — THIAMINE HYDROCHLORIDE 105 MILLIGRAM(S): 100 INJECTION, SOLUTION INTRAMUSCULAR; INTRAVENOUS at 18:24

## 2024-09-28 RX ADMIN — THIAMINE HYDROCHLORIDE 105 MILLIGRAM(S): 100 INJECTION, SOLUTION INTRAMUSCULAR; INTRAVENOUS at 01:21

## 2024-09-28 RX ADMIN — MULTI VITAMIN/MINERAL SUPPLEMENT WITH ASCORBIC ACID, NIACIN, PYRIDOXINE, PANTOTHENIC ACID, FOLIC ACID, RIBOFLAVIN, THIAMIN, BIOTIN, COBALAMIN AND ZINC. 1 TABLET(S): 60; 20; 12.5; 10; 10; 1.7; 1.5; 1; .3; .006 TABLET, COATED ORAL at 11:15

## 2024-09-28 RX ADMIN — Medication 650 MILLIGRAM(S): at 17:51

## 2024-09-28 NOTE — PROGRESS NOTE ADULT - PROBLEM SELECTOR PLAN 1
History of alcohol withdrawal seizures. History of many episodes of withdrawal.   Patient states he has MANY drinks per week. Last drink yesterday at 3PM  CIWA on admission of 15  Current CIWA of 10.   s/p 2L fluids, Mg and K repletion     Plan:  - completed loading dose of phenobarbital 20 mg/kg 650 mg -> 520 (3 hours later) -> 390 (3 hours later) on 09/26  - CIWA Protocol  - folic acid 1mg qd   -thiamine 500 mg IV qd  -multivitamin qd  - c/w Naltrexone 50 mg daily (can increase to 75 mg/100 mg daily depending on tolerability) History of alcohol withdrawal seizures. History of many episodes of withdrawal.   Patient states he has MANY drinks per week. Last drink yesterday at 3PM  CIWA on admission of 15  CIWA of 10 prior to transfer to the floors  CIWA of 6 during while on RMF  s/p 2L fluids, Mg and K repletion     Plan:  - completed loading dose of phenobarbital 20 mg/kg 650 mg -> 520 (3 hours later) -> 390 (3 hours later) on 09/26  - CIWA Protocol  -CIWA score check q8  - folic acid 1mg qd   -thiamine 500 mg IV qd  -multivitamin qd  - Naltrexone 50 mg qd (can increase to 75 mg/100 mg daily depending on tolerability) History of alcohol withdrawal seizures. History of many episodes of withdrawal.   Patient states he has MANY drinks per week. Last drink yesterday at 3PM  CIWA on admission of 15  CIWA of 10 prior to transfer to the floors  CIWA of 6 during while on RMF  s/p 2L fluids, Mg and K repletion     Had 4 episodes of diarrhea when transitioned to the floors. most likely in the setting of alcohol withdrawal.     Plan:  - completed loading dose of phenobarbital 20 mg/kg 650 mg -> 520 (3 hours later) -> 390 (3 hours later) on 09/26  - CIWA Protocol  -CIWA score check q8  - folic acid 1mg qd   -thiamine 500 mg IV qd  -multivitamin qd  - Naltrexone 50 mg qd (can increase to 75 mg/100 mg daily depending on tolerability) History of alcohol withdrawal seizures. History of many episodes of withdrawal.   Patient states he has MANY drinks per week. Last drink yesterday at 3PM  CIWA on admission of 15  CIWA of 10 prior to transfer to the floors  CIWA of 6 during while on RMF  s/p 2L fluids, Mg and K repletion     Had 4 episodes of diarrhea when transitioned to the floors. most likely in the setting of alcohol withdrawal.     Patient also reports that he has peripheral neuropathy, especially in his hands from alcohol use.     Plan:  - completed loading dose of phenobarbital 20 mg/kg 650 mg -> 520 (3 hours later) -> 390 (3 hours later) on 09/26  - CIWA Protocol  -CIWA score check q8  - folic acid 1mg qd   -thiamine 500 mg IV qd  -multivitamin qd  - Naltrexone 50 mg qd (can increase to 75 mg/100 mg daily depending on tolerability) History of alcohol withdrawal seizures. History of many episodes of withdrawal.   Patient states he has MANY drinks per week. Last drink 3 days ago   Patient is at this moment in time, almost out of the 3 day window for withdrawal  CIWA on admission of 15  CIWA of 10 prior to transfer to the floors  CIWA of 6 during while on RMF during patient interview       Had 4 episodes of diarrhea when transitioned to the floors. most likely in the setting of alcohol withdrawal.     Patient also reports that he has peripheral neuropathy, especially in his hands from alcohol use.     Plan:  - completed loading dose of phenobarbital 20 mg/kg 650 mg -> 520 (3 hours later) -> 390 (3 hours later) on 09/26  - follow CIWA Protocol  - folic acid 1mg qd   -thiamine 500 mg IV qd  -multivitamin qd  - Naltrexone 50 mg qd (can increase to 75 mg/100 mg daily depending on tolerability)  -f/u with social work

## 2024-09-28 NOTE — PROGRESS NOTE ADULT - SUBJECTIVE AND OBJECTIVE BOX
CC: Patient is a 59y old  Male who presents with a chief complaint of Alcohol withdrawal (28 Sep 2024 06:45)    -------TRANSFER FROM [facility] to [facility]---------------  Hospital course:  59M w/ PMHx of AUD (with history of withdrawl seizures) and MDD p/w tremors, nausea, and sweats, found to be in alcohol withdrawal w/CIWA 15 at [facility], given Lorazepam 1mg IVP x2 Librium 25mg, folic acid 1mg, Mag sulfate IVP 2g  =and transferred to [facility] for further management of acute alcohol withdrawal. On arrival to [facility], CIWA 10, started on  phenobarbitaol loading dose 20 mg/kg, last dose at 11am 9/27. Since then, CIWA 0 and pt has not required additional phenobarb doses. On PE, pt found to be hyperreflexic and unstable gait, wellbutrin was d/camron i/s/o possible interaction with phenobarb. Psych consulted, agree with discontinuing wellbutrin until pt out of acute alcohol withdrawal phase. Of note, pt was already on naltrexone for alcohol use disorder and was continued on it. Patient is HDS and ready for stepdown to [facility] for continued care.       INTERVAL EVENTS: ANNA    SUBJECTIVE / INTERVAL HPI: Patient seen and examined at bedside.     ROS: negative unless otherwise stated above.    VITAL SIGNS:  Vital Signs Last 24 Hrs  T(C): 36.7 (28 Sep 2024 11:30), Max: 37.1 (27 Sep 2024 18:11)  T(F): 98 (28 Sep 2024 11:30), Max: 98.7 (27 Sep 2024 18:11)  HR: 58 (28 Sep 2024 12:20) (50 - 64)  BP: 146/82 (28 Sep 2024 12:20) (123/77 - 146/82)  BP(mean): 106 (28 Sep 2024 12:20) (93 - 106)  RR: 17 (28 Sep 2024 12:20) (17 - 18)  SpO2: 95% (28 Sep 2024 12:20) (95% - 99%)    Parameters below as of 28 Sep 2024 12:20  Patient On (Oxygen Delivery Method): room air          09-27-24 @ 07:01  -  09-28-24 @ 07:00  --------------------------------------------------------  IN: 300 mL / OUT: 700 mL / NET: -400 mL    09-28-24 @ 07:01  -  09-28-24 @ 14:20  --------------------------------------------------------  IN: 240 mL / OUT: 760 mL / NET: -520 mL        PHYSICAL EXAM:    General: NAD  HEENT: MMM  Neck: supple  Cardiovascular: +S1/S2; RRR  Respiratory: CTA B/L; no W/R/R  Gastrointestinal: soft, NT/ND  Extremities: WWP; no edema, clubbing or cyanosis  Vascular: 2+ radial, DP/PT pulses B/L  Neurological: AAOx3; no focal deficits    MEDICATIONS:  MEDICATIONS  (STANDING):  enoxaparin Injectable 40 milliGRAM(s) SubCutaneous every 24 hours  folic acid 1 milliGRAM(s) Oral daily  influenza   Vaccine 0.5 milliLiter(s) IntraMuscular once  multivitamin 1 Tablet(s) Oral daily  naltrexone 50 milliGRAM(s) Oral daily  thiamine IVPB 500 milliGRAM(s) IV Intermittent every 8 hours    MEDICATIONS  (PRN):  PHENobarbital Injectable 130 milliGRAM(s) IV Push every 15 minutes PRN RASS>3 or CIWA > 7      ALLERGIES:  Allergies    penicillin (Hives)    Intolerances        LABS:                        12.0   6.40  )-----------( 165      ( 28 Sep 2024 06:20 )             37.1     09-28    137  |  101  |  12  ----------------------------<  93  3.9   |  25  |  0.75    Ca    8.4      28 Sep 2024 06:20  Phos  3.3     09-28  Mg     2.0     09-28    TPro  6.1  /  Alb  3.6  /  TBili  1.2  /  DBili  x   /  AST  43[H]  /  ALT  34  /  AlkPhos  87  09-28      Urinalysis Basic - ( 28 Sep 2024 06:20 )    Color: x / Appearance: x / SG: x / pH: x  Gluc: 93 mg/dL / Ketone: x  / Bili: x / Urobili: x   Blood: x / Protein: x / Nitrite: x   Leuk Esterase: x / RBC: x / WBC x   Sq Epi: x / Non Sq Epi: x / Bacteria: x      CAPILLARY BLOOD GLUCOSE      POCT Blood Glucose.: 110 mg/dL (26 Sep 2024 20:26)      RADIOLOGY & ADDITIONAL TESTS: Reviewed. CC: Patient is a 59y old  Male who presents with a chief complaint of Alcohol withdrawal (28 Sep 2024 06:45)    -------TRANSFER FROM [facility] to [facility]---------------  Hospital course:  59M w/ PMHx of AUD (with history of withdrawl seizures) and MDD p/w tremors, nausea, and sweats, found to be in alcohol withdrawal w/CIWA 15 at [facility], given Lorazepam 1mg IVP x2 Librium 25mg, folic acid 1mg, Mag sulfate IVP 2g  =and transferred to [facility] for further management of acute alcohol withdrawal. On arrival to [facility], CIWA 10, started on  phenobarbitaol loading dose 20 mg/kg, last dose at 11am 9/27. Since then, CIWA 0 and pt has not required additional phenobarb doses. On PE, pt found to be hyperreflexic and unstable gait, wellbutrin was d/camron i/s/o possible interaction with phenobarb. Psych consulted, agree with discontinuing wellbutrin until pt out of acute alcohol withdrawal phase. Of note, pt was already on naltrexone for alcohol use disorder and was continued on it. Patient is HDS and ready for stepdown to [facility] for continued care.       INTERVAL EVENTS: ANNA    SUBJECTIVE / INTERVAL HPI: Patient seen and examined at bedside.  Patient reports that he feels tired. Has a bit of nausea, but no vomiting. Had 4 episodes of diarrhea today. Has a mild headache. Urinating ok. Appetite ok.    Denies fever, chills, chest pain, vomiting    ROS: negative unless otherwise stated above.    VITAL SIGNS:  Vital Signs Last 24 Hrs  T(C): 36.7 (28 Sep 2024 11:30), Max: 37.1 (27 Sep 2024 18:11)  T(F): 98 (28 Sep 2024 11:30), Max: 98.7 (27 Sep 2024 18:11)  HR: 58 (28 Sep 2024 12:20) (50 - 64)  BP: 146/82 (28 Sep 2024 12:20) (123/77 - 146/82)  BP(mean): 106 (28 Sep 2024 12:20) (93 - 106)  RR: 17 (28 Sep 2024 12:20) (17 - 18)  SpO2: 95% (28 Sep 2024 12:20) (95% - 99%)    Parameters below as of 28 Sep 2024 12:20  Patient On (Oxygen Delivery Method): room air          09-27-24 @ 07:01  -  09-28-24 @ 07:00  --------------------------------------------------------  IN: 300 mL / OUT: 700 mL / NET: -400 mL    09-28-24 @ 07:01  -  09-28-24 @ 14:20  --------------------------------------------------------  IN: 240 mL / OUT: 760 mL / NET: -520 mL        PHYSICAL EXAM:    General: NAD  HEENT: MMM. EOM intact. No nystagmus  Neck: supple  Cardiovascular: +S1/S2; RRR  Respiratory: CTA B/L; no W/R/R  Gastrointestinal: soft, NT/ND, TTP in the UR quad  Extremities: WWP; no edema, clubbing or cyanosis  Vascular: 2+ radial, DP/PT pulses B/L  Neurological: AAOx3; no focal deficits    MEDICATIONS:  MEDICATIONS  (STANDING):  enoxaparin Injectable 40 milliGRAM(s) SubCutaneous every 24 hours  folic acid 1 milliGRAM(s) Oral daily  influenza   Vaccine 0.5 milliLiter(s) IntraMuscular once  multivitamin 1 Tablet(s) Oral daily  naltrexone 50 milliGRAM(s) Oral daily  thiamine IVPB 500 milliGRAM(s) IV Intermittent every 8 hours    MEDICATIONS  (PRN):  PHENobarbital Injectable 130 milliGRAM(s) IV Push every 15 minutes PRN RASS>3 or CIWA > 7      ALLERGIES:  Allergies    penicillin (Hives)    Intolerances        LABS:                        12.0   6.40  )-----------( 165      ( 28 Sep 2024 06:20 )             37.1     09-28    137  |  101  |  12  ----------------------------<  93  3.9   |  25  |  0.75    Ca    8.4      28 Sep 2024 06:20  Phos  3.3     09-28  Mg     2.0     09-28    TPro  6.1  /  Alb  3.6  /  TBili  1.2  /  DBili  x   /  AST  43[H]  /  ALT  34  /  AlkPhos  87  09-28      Urinalysis Basic - ( 28 Sep 2024 06:20 )    Color: x / Appearance: x / SG: x / pH: x  Gluc: 93 mg/dL / Ketone: x  / Bili: x / Urobili: x   Blood: x / Protein: x / Nitrite: x   Leuk Esterase: x / RBC: x / WBC x   Sq Epi: x / Non Sq Epi: x / Bacteria: x      CAPILLARY BLOOD GLUCOSE      POCT Blood Glucose.: 110 mg/dL (26 Sep 2024 20:26)      RADIOLOGY & ADDITIONAL TESTS: Reviewed. CC: Patient is a 59y old  Male who presents with a chief complaint of Alcohol withdrawal (28 Sep 2024 06:45)    -------TRANSFER FROM [facility] to [facility]---------------  Hospital course:  59M w/ PMHx of AUD (with history of withdrawl seizures) and MDD p/w tremors, nausea, and sweats, found to be in alcohol withdrawal w/CIWA 15 at [facility], given Lorazepam 1mg IVP x2 Librium 25mg, folic acid 1mg, Mag sulfate IVP 2g  =and transferred to [facility] for further management of acute alcohol withdrawal. On arrival to [facility], CIWA 10, started on  phenobarbitaol loading dose 20 mg/kg, last dose at 11am 9/27. Since then, CIWA 0 and pt has not required additional phenobarb doses. On PE, pt found to be hyperreflexic and unstable gait, wellbutrin was d/camron i/s/o possible interaction with phenobarb. Psych consulted, agree with discontinuing wellbutrin until pt out of acute alcohol withdrawal phase. Of note, pt was already on naltrexone for alcohol use disorder and was continued on it. Patient is HDS and ready for stepdown to [facility] for continued care.       INTERVAL EVENTS: ANNA    SUBJECTIVE / INTERVAL HPI: Patient seen and examined at bedside.  Patient reports that he feels tired. Has a bit of nausea, but no vomiting. Had 4 episodes of diarrhea today. Has a mild headache. Urinating ok. Appetite ok.    Denies fever, chills, chest pain, vomiting    ROS: negative unless otherwise stated above.    VITAL SIGNS:  Vital Signs Last 24 Hrs  T(C): 36.7 (28 Sep 2024 11:30), Max: 37.1 (27 Sep 2024 18:11)  T(F): 98 (28 Sep 2024 11:30), Max: 98.7 (27 Sep 2024 18:11)  HR: 58 (28 Sep 2024 12:20) (50 - 64)  BP: 146/82 (28 Sep 2024 12:20) (123/77 - 146/82)  BP(mean): 106 (28 Sep 2024 12:20) (93 - 106)  RR: 17 (28 Sep 2024 12:20) (17 - 18)  SpO2: 95% (28 Sep 2024 12:20) (95% - 99%)    Parameters below as of 28 Sep 2024 12:20  Patient On (Oxygen Delivery Method): room air          09-27-24 @ 07:01  -  09-28-24 @ 07:00  --------------------------------------------------------  IN: 300 mL / OUT: 700 mL / NET: -400 mL    09-28-24 @ 07:01  -  09-28-24 @ 14:20  --------------------------------------------------------  IN: 240 mL / OUT: 760 mL / NET: -520 mL        PHYSICAL EXAM:    General: NAD  HEENT: MMM. EOM intact. No nystagmus  Neck: supple  Cardiovascular: +S1/S2; RRR  Respiratory: CTA B/L; no W/R/R  Gastrointestinal: soft, NT/ND, TTP in the UR quad  Extremities: WWP; no edema, clubbing or cyanosis  Vascular: 2+ radial, DP/PT pulses B/L  Neurological: AAOx3; no focal deficits. No hyperreflexia noted on exam.    MEDICATIONS:  MEDICATIONS  (STANDING):  enoxaparin Injectable 40 milliGRAM(s) SubCutaneous every 24 hours  folic acid 1 milliGRAM(s) Oral daily  influenza   Vaccine 0.5 milliLiter(s) IntraMuscular once  multivitamin 1 Tablet(s) Oral daily  naltrexone 50 milliGRAM(s) Oral daily  thiamine IVPB 500 milliGRAM(s) IV Intermittent every 8 hours    MEDICATIONS  (PRN):  PHENobarbital Injectable 130 milliGRAM(s) IV Push every 15 minutes PRN RASS>3 or CIWA > 7      ALLERGIES:  Allergies    penicillin (Hives)    Intolerances        LABS:                        12.0   6.40  )-----------( 165      ( 28 Sep 2024 06:20 )             37.1     09-28    137  |  101  |  12  ----------------------------<  93  3.9   |  25  |  0.75    Ca    8.4      28 Sep 2024 06:20  Phos  3.3     09-28  Mg     2.0     09-28    TPro  6.1  /  Alb  3.6  /  TBili  1.2  /  DBili  x   /  AST  43[H]  /  ALT  34  /  AlkPhos  87  09-28      Urinalysis Basic - ( 28 Sep 2024 06:20 )    Color: x / Appearance: x / SG: x / pH: x  Gluc: 93 mg/dL / Ketone: x  / Bili: x / Urobili: x   Blood: x / Protein: x / Nitrite: x   Leuk Esterase: x / RBC: x / WBC x   Sq Epi: x / Non Sq Epi: x / Bacteria: x      CAPILLARY BLOOD GLUCOSE      POCT Blood Glucose.: 110 mg/dL (26 Sep 2024 20:26)      RADIOLOGY & ADDITIONAL TESTS: Reviewed. CC: Patient is a 59y old  Male who presents with a chief complaint of Alcohol withdrawal (28 Sep 2024 06:45)    -------TRANSFER FROM [facility] to [facility]---------------  Hospital course:  59M w/ PMHx of AUD (with history of withdrawl seizures) and MDD p/w tremors, nausea, and sweats, found to be in alcohol withdrawal w/CIWA 15 at [facility], given Lorazepam 1mg IVP x2 Librium 25mg, folic acid 1mg, Mag sulfate IVP 2g  =and transferred to [facility] for further management of acute alcohol withdrawal. On arrival to [facility], CIWA 10, started on  phenobarbitaol loading dose 20 mg/kg, last dose at 11am 9/27. Since then, CIWA 0 and pt has not required additional phenobarb doses. On PE, pt found to be hyperreflexic and unstable gait, wellbutrin was d/camron i/s/o possible interaction with phenobarb. Psych consulted, agree with discontinuing wellbutrin until pt out of acute alcohol withdrawal phase. Of note, pt was already on naltrexone for alcohol use disorder and was continued on it. Patient is HDS and ready for stepdown to [facility] for continued care.       INTERVAL EVENTS: ANNA    SUBJECTIVE / INTERVAL HPI: Patient seen and examined at bedside.  Patient reports that he feels tired. Has a bit of nausea, but no vomiting. Had 4 episodes of diarrhea today. Has a mild headache. Urinating ok. Appetite ok. Patient also reports that he has peripheral neuropathy, especially in his hands from alcohol use.     Denies fever, chills, chest pain, vomiting    ROS: negative unless otherwise stated above.    VITAL SIGNS:  Vital Signs Last 24 Hrs  T(C): 36.7 (28 Sep 2024 11:30), Max: 37.1 (27 Sep 2024 18:11)  T(F): 98 (28 Sep 2024 11:30), Max: 98.7 (27 Sep 2024 18:11)  HR: 58 (28 Sep 2024 12:20) (50 - 64)  BP: 146/82 (28 Sep 2024 12:20) (123/77 - 146/82)  BP(mean): 106 (28 Sep 2024 12:20) (93 - 106)  RR: 17 (28 Sep 2024 12:20) (17 - 18)  SpO2: 95% (28 Sep 2024 12:20) (95% - 99%)    Parameters below as of 28 Sep 2024 12:20  Patient On (Oxygen Delivery Method): room air          09-27-24 @ 07:01  -  09-28-24 @ 07:00  --------------------------------------------------------  IN: 300 mL / OUT: 700 mL / NET: -400 mL    09-28-24 @ 07:01  -  09-28-24 @ 14:20  --------------------------------------------------------  IN: 240 mL / OUT: 760 mL / NET: -520 mL        PHYSICAL EXAM:    General: NAD  HEENT: MMM. EOM intact. No nystagmus  Neck: supple  Cardiovascular: +S1/S2; RRR  Respiratory: CTA B/L; no W/R/R  Gastrointestinal: soft, NT/ND, TTP in the UR quad  Extremities: WWP; no edema, clubbing or cyanosis  Vascular: 2+ radial, DP/PT pulses B/L  Neurological: AAOx3; no focal deficits. No hyperreflexia noted on exam.    MEDICATIONS:  MEDICATIONS  (STANDING):  enoxaparin Injectable 40 milliGRAM(s) SubCutaneous every 24 hours  folic acid 1 milliGRAM(s) Oral daily  influenza   Vaccine 0.5 milliLiter(s) IntraMuscular once  multivitamin 1 Tablet(s) Oral daily  naltrexone 50 milliGRAM(s) Oral daily  thiamine IVPB 500 milliGRAM(s) IV Intermittent every 8 hours    MEDICATIONS  (PRN):  PHENobarbital Injectable 130 milliGRAM(s) IV Push every 15 minutes PRN RASS>3 or CIWA > 7      ALLERGIES:  Allergies    penicillin (Hives)    Intolerances        LABS:                        12.0   6.40  )-----------( 165      ( 28 Sep 2024 06:20 )             37.1     09-28    137  |  101  |  12  ----------------------------<  93  3.9   |  25  |  0.75    Ca    8.4      28 Sep 2024 06:20  Phos  3.3     09-28  Mg     2.0     09-28    TPro  6.1  /  Alb  3.6  /  TBili  1.2  /  DBili  x   /  AST  43[H]  /  ALT  34  /  AlkPhos  87  09-28      Urinalysis Basic - ( 28 Sep 2024 06:20 )    Color: x / Appearance: x / SG: x / pH: x  Gluc: 93 mg/dL / Ketone: x  / Bili: x / Urobili: x   Blood: x / Protein: x / Nitrite: x   Leuk Esterase: x / RBC: x / WBC x   Sq Epi: x / Non Sq Epi: x / Bacteria: x      CAPILLARY BLOOD GLUCOSE      POCT Blood Glucose.: 110 mg/dL (26 Sep 2024 20:26)      RADIOLOGY & ADDITIONAL TESTS: Reviewed. CC: Patient is a 59y old  Male who presents with a chief complaint of Alcohol withdrawal (28 Sep 2024 06:45)    -------TRANSFER FROM [facility] to [facility]---------------  Hospital course:  59M w/ PMHx of AUD (with history of withdrawal seizures) and MDD p/w tremors, nausea, and sweats, found to be in alcohol withdrawal w/CIWA 15 at [facility], given Lorazepam 1mg IVP x2 Librium 25mg, folic acid 1mg, Mag sulfate IVP 2g  =and transferred to [facility] for further management of acute alcohol withdrawal. On arrival to [facility], CIWA 10, started on  phenobarbitaol loading dose 20 mg/kg, last dose at 11am 9/27. Since then, CIWA 0 and pt has not required additional phenobarb doses. On PE, pt found to be hyperreflexic and unstable gait, wellbutrin was d/camron i/s/o possible interaction with phenobarb. Psych consulted, agree with discontinuing wellbutrin until pt out of acute alcohol withdrawal phase. Of note, pt was already on naltrexone for alcohol use disorder and was continued on it. Patient is HDS and ready for stepdown to [facility] for continued care.       INTERVAL EVENTS: ANNA    SUBJECTIVE / INTERVAL HPI: Patient seen and examined at bedside.  Patient reports that he feels tired. Denies visual, tactile, or auditory hallucinations. Has a mild headache Reports feeling anxious about work. Has slight tremor of the hands when outstretched hands. Has some nausea but no vomiting.   Had 4 episodes of diarrhea today. Not sweaty. Patient not agitated . Patient was oriented to self, place, and day.  Urinating ok. Appetite ok. Patient also reports that he has peripheral neuropathy, especially in his hands from alcohol use.     Denies fever, chills, chest pain, sob.    ROS: negative unless otherwise stated above.    VITAL SIGNS:  Vital Signs Last 24 Hrs  T(C): 36.7 (28 Sep 2024 11:30), Max: 37.1 (27 Sep 2024 18:11)  T(F): 98 (28 Sep 2024 11:30), Max: 98.7 (27 Sep 2024 18:11)  HR: 58 (28 Sep 2024 12:20) (50 - 64)  BP: 146/82 (28 Sep 2024 12:20) (123/77 - 146/82)  BP(mean): 106 (28 Sep 2024 12:20) (93 - 106)  RR: 17 (28 Sep 2024 12:20) (17 - 18)  SpO2: 95% (28 Sep 2024 12:20) (95% - 99%)    Parameters below as of 28 Sep 2024 12:20  Patient On (Oxygen Delivery Method): room air          09-27-24 @ 07:01  -  09-28-24 @ 07:00  --------------------------------------------------------  IN: 300 mL / OUT: 700 mL / NET: -400 mL    09-28-24 @ 07:01  -  09-28-24 @ 14:20  --------------------------------------------------------  IN: 240 mL / OUT: 760 mL / NET: -520 mL        PHYSICAL EXAM:    General: NAD  HEENT: MMM. EOM intact. No nystagmus  Neck: supple  Cardiovascular: +S1/S2; RRR  Respiratory: CTA B/L; no W/R/R  Gastrointestinal: soft, NT/ND, TTP in the UR quad  Extremities: WWP; no edema, clubbing or cyanosis  Vascular: 2+ radial, DP/PT pulses B/L  Neurological: AAOx3; no focal deficits. No hyperreflexia noted on exam.    MEDICATIONS:  MEDICATIONS  (STANDING):  enoxaparin Injectable 40 milliGRAM(s) SubCutaneous every 24 hours  folic acid 1 milliGRAM(s) Oral daily  influenza   Vaccine 0.5 milliLiter(s) IntraMuscular once  multivitamin 1 Tablet(s) Oral daily  naltrexone 50 milliGRAM(s) Oral daily  thiamine IVPB 500 milliGRAM(s) IV Intermittent every 8 hours    MEDICATIONS  (PRN):  PHENobarbital Injectable 130 milliGRAM(s) IV Push every 15 minutes PRN RASS>3 or CIWA > 7      ALLERGIES:  Allergies    penicillin (Hives)    Intolerances        LABS:                        12.0   6.40  )-----------( 165      ( 28 Sep 2024 06:20 )             37.1     09-28    137  |  101  |  12  ----------------------------<  93  3.9   |  25  |  0.75    Ca    8.4      28 Sep 2024 06:20  Phos  3.3     09-28  Mg     2.0     09-28    TPro  6.1  /  Alb  3.6  /  TBili  1.2  /  DBili  x   /  AST  43[H]  /  ALT  34  /  AlkPhos  87  09-28      Urinalysis Basic - ( 28 Sep 2024 06:20 )    Color: x / Appearance: x / SG: x / pH: x  Gluc: 93 mg/dL / Ketone: x  / Bili: x / Urobili: x   Blood: x / Protein: x / Nitrite: x   Leuk Esterase: x / RBC: x / WBC x   Sq Epi: x / Non Sq Epi: x / Bacteria: x      CAPILLARY BLOOD GLUCOSE      POCT Blood Glucose.: 110 mg/dL (26 Sep 2024 20:26)      RADIOLOGY & ADDITIONAL TESTS: Reviewed.

## 2024-09-28 NOTE — PROGRESS NOTE ADULT - ASSESSMENT
59M w/ PMHx of AUD (with history of withdrawl seizures) and MDD p/w tremors, nausea, and sweats, found to be in alcohol withdrawal w/CIWA 15 at [facility] and transferred to [facility] for further management of acute alcohol withdrawal.         PSYCHIATRY   #MDD   History of MDD. Home medications: Wellbutrin (unknown dosage, Last taken on Wednesday morning)  - Psych  consulted  - hold wellbutrin XR until he is out of acute alcohol withdrawal and then restart at 150 mg for 2 days with increase to 300 mg after.     PROPHYLACTIC MEASURE   F: None indicated   E: Replete as needed   N: Regular  DVT Prophylaxis: Lovenox  Activity as tolerated   Code status: FULL          59M w/ PMHx of AUD (with history of withdrawl seizures) and MDD p/w tremors, nausea, and sweats, found to be in alcohol withdrawal w/CIWA 15 at [facility] and transferred to [facility] for further management of acute alcohol withdrawal.              59M w/ PMHx of AUD (with history of withdrawal seizures) and MDD p/w tremors, nausea, and sweats, found to be in alcohol withdrawal w/CIWA 15 at [facility] and transferred to [facility] for further management of acute alcohol withdrawal.

## 2024-09-28 NOTE — PROGRESS NOTE ADULT - SUBJECTIVE AND OBJECTIVE BOX
**INCOMPLETE NOTE    INTERVAL/OVERNIGHT EVENTS: None    SUBJECTIVE:  Patient seen and examined at bedside, comfortable, NAD. Denied fever, chest pain, dyspnea, abdominal pain.     Vital Signs Last 12 Hrs  T(F): 97.2 (09-28-24 @ 06:31), Max: 97.9 (09-27-24 @ 22:40)  HR: 50 (09-28-24 @ 04:31) (50 - 56)  BP: 132/87 (09-28-24 @ 04:31) (123/81 - 132/87)  BP(mean): 105 (09-28-24 @ 04:31) (93 - 105)  RR: 18 (09-28-24 @ 04:31) (17 - 18)  SpO2: 95% (09-28-24 @ 04:31) (95% - 99%)  I&O's Summary    27 Sep 2024 07:01  -  28 Sep 2024 06:45  --------------------------------------------------------  IN: 300 mL / OUT: 700 mL / NET: -400 mL        PHYSICAL EXAM:  General: NAD  HEENT: PERRL, EOM intact, sclera anicteric, MMM  Cardiovascular: RRR; no MRG;   Respiratory: CTAB; no WRR  GI/: soft; NTND; BS x4  Extremities: WWP; 2+ peripheral pulses bilaterally; no LE edema  Skin: normal color & turgor; no rash  Neurologic: aox3; no focal deficits    LABS:                        12.0   6.40  )-----------( 165      ( 28 Sep 2024 06:20 )             37.1     09-27    141  |  106  |  15  ----------------------------<  85  4.2   |  27  |  0.79    Ca    8.3[L]      27 Sep 2024 07:49  Phos  3.6     09-27  Mg     2.0     09-27    TPro  6.0  /  Alb  3.7  /  TBili  0.9  /  DBili  x   /  AST  50[H]  /  ALT  38  /  AlkPhos  82  09-27      Urinalysis Basic - ( 27 Sep 2024 07:49 )    Color: x / Appearance: x / SG: x / pH: x  Gluc: 85 mg/dL / Ketone: x  / Bili: x / Urobili: x   Blood: x / Protein: x / Nitrite: x   Leuk Esterase: x / RBC: x / WBC x   Sq Epi: x / Non Sq Epi: x / Bacteria: x          RADIOLOGY & ADDITIONAL TESTS:    MEDICATIONS  (STANDING):  enoxaparin Injectable 40 milliGRAM(s) SubCutaneous every 24 hours  folic acid 1 milliGRAM(s) Oral daily  influenza   Vaccine 0.5 milliLiter(s) IntraMuscular once  multivitamin 1 Tablet(s) Oral daily  naltrexone 50 milliGRAM(s) Oral daily  thiamine IVPB 500 milliGRAM(s) IV Intermittent every 8 hours    MEDICATIONS  (PRN):  PHENobarbital Injectable 130 milliGRAM(s) IV Push every 15 minutes PRN RASS>3 or CIWA > 7   --------TRANSFER FROM [facility] to [facility]---------------  Hospital course:  59M w/ PMHx of AUD (with history of withdrawl seizures) and MDD p/w tremors, nausea, and sweats, found to be in alcohol withdrawal w/CIWA 15 at [facility], given Lorazepam 1mg IVP x2 Librium 25mg, folic acid 1mg, Mag sulfate IVP 2g  =and transferred to [facility] for further management of acute alcohol withdrawal. On arrival to [facility], CIWA 10, started on  phenobarbitaol loading dose 20 mg/kg, last dose at 11am 9/27. Since then, CIWA 0 and pt has not required additional phenobarb doses. On PE, pt found to be hyperreflexic and unstable gait, wellbutrin was d/camron i/s/o possible interaction with phenobarb. Psych consulted, agree with discontinuing wellbutrin until pt out of acute alcohol withdrawal phase. Of note, pt was already on naltrexone for alcohol use disorder and was continued on it. Patient is HDS and ready for stepdown to [facility] for continued care.     INTERVAL/OVERNIGHT EVENTS: None    SUBJECTIVE: Patient seen and examined at bedside, comfortable, NAD. He is feeling overall better. Does complain of BL LE weakness but otherwise no concerns or complaints.  Denied fever, chest pain, dyspnea, abdominal pain.     Vital Signs Last 12 Hrs  T(F): 97.2 (09-28-24 @ 06:31), Max: 97.9 (09-27-24 @ 22:40)  HR: 50 (09-28-24 @ 04:31) (50 - 56)  BP: 132/87 (09-28-24 @ 04:31) (123/81 - 132/87)  BP(mean): 105 (09-28-24 @ 04:31) (93 - 105)  RR: 18 (09-28-24 @ 04:31) (17 - 18)  SpO2: 95% (09-28-24 @ 04:31) (95% - 99%)  I&O's Summary    27 Sep 2024 07:01  -  28 Sep 2024 06:45  --------------------------------------------------------  IN: 300 mL / OUT: 700 mL / NET: -400 mL    PHYSICAL EXAM:  General: NAD  HEENT: PERRL, EOM intact, sclera anicteric, MMM  Cardiovascular: RRR; no MRG;   Respiratory: CTAB; no WRR  GI/: soft; NTND; BS x4  Extremities: WWP; 2+ peripheral pulses bilaterally; no LE edema  Skin: normal color & turgor; no rash  Neurologic: aox3; no focal deficits +hyperreflexia of BL LE    LABS:                        12.0   6.40  )-----------( 165      ( 28 Sep 2024 06:20 )             37.1     09-27    141  |  106  |  15  ----------------------------<  85  4.2   |  27  |  0.79    Ca    8.3[L]      27 Sep 2024 07:49  Phos  3.6     09-27  Mg     2.0     09-27    TPro  6.0  /  Alb  3.7  /  TBili  0.9  /  DBili  x   /  AST  50[H]  /  ALT  38  /  AlkPhos  82  09-27      Urinalysis Basic - ( 27 Sep 2024 07:49 )    Color: x / Appearance: x / SG: x / pH: x  Gluc: 85 mg/dL / Ketone: x  / Bili: x / Urobili: x   Blood: x / Protein: x / Nitrite: x   Leuk Esterase: x / RBC: x / WBC x   Sq Epi: x / Non Sq Epi: x / Bacteria: x          RADIOLOGY & ADDITIONAL TESTS:    MEDICATIONS  (STANDING):  enoxaparin Injectable 40 milliGRAM(s) SubCutaneous every 24 hours  folic acid 1 milliGRAM(s) Oral daily  influenza   Vaccine 0.5 milliLiter(s) IntraMuscular once  multivitamin 1 Tablet(s) Oral daily  naltrexone 50 milliGRAM(s) Oral daily  thiamine IVPB 500 milliGRAM(s) IV Intermittent every 8 hours    MEDICATIONS  (PRN):  PHENobarbital Injectable 130 milliGRAM(s) IV Push every 15 minutes PRN RASS>3 or CIWA > 7

## 2024-09-28 NOTE — PROGRESS NOTE ADULT - PROBLEM SELECTOR PLAN 3
F: None indicated   E: Replete as needed   N: Regular  DVT Prophylaxis: Lovenox  Activity as tolerated   Code status: FULL Reported by [facility] team that patient was having difficulty with walking. Was thought to be possible interaction with phenobarbital and patient's home MDD medication Wellbutrin. Per Psyche recc, the Wellbutrin was held.  -f/u PT consult for gait assessment

## 2024-09-28 NOTE — PROGRESS NOTE ADULT - ATTENDING COMMENTS
Psych to see for rehab and continue Phenobarbital load.
EtOH withdrawal, on CIWA with phenobarbital taper. Has not required any breakthrough doses. CIWA <7. Transfer to [facility]. Rest as per above.
Patient stepped down from MICU. He was there for alcohol withdrawal (last drink 9/26). He drinks 2 pints of Vodka periodically. Recently relapsed, last WD in July 2024 at [facility]. CIWA was elevated on admission (as was alcohol level) and admitted to MICU for phenobarb load and now on PRN. Improved symptoms, slightly anxious/worried about the future. Labs reviewed, no significant electrolyte abnormality and improving transaminitis.    - Continue with CIWA with phenobarb PRN, will be out of alcohol WD window soon  -  consult for outpatient resources, he's not interested in outpatient rehab  - Psych consult appreciated -- holding Wellbutrin, consideration of increasing Naltrexone to prevent relapse, though patient states it's not effective

## 2024-09-28 NOTE — PROGRESS NOTE ADULT - ASSESSMENT
59M w/ PMHx of AUD (with history of withdrawl seizures) and MDD p/w tremors, nausea, and sweats, found to be in alcohol withdrawal w/CIWA 15 at [facility] and transferred to [facility] for further management of acute alcohol withdrawal.     NEURO  AOx3 (at baseline)     #Alcohol Withdrawal   #Alcohol dependence   History of alcohol withdrawal seizures. History of many episodes of withdrawl.    Patient states he has MANY drinks per week. Last drink yesterday at 3PM  Current CIWA score of 10.   s/p 2L fluids, Mg and K repletion     Plan:  - Loading dose of phenobarbitaol 20 mg/kg 650 mg -> 520 (3 hours later) -> 390 (3 hours later)  - CIWA Protocol: phenobarbital 130 IV push q15min for 4 doses for CIWA > 7 or RAAS <3  - folic acid 1mg qday and thiamine 500 mg IV qday, MVI daily   - F/up urine drug screen   - F/up EKG    ENDOCRINE   ESPINOZA    CARDIOVASCULAR  ESPINOZA    PULM/RESP  ESPINOZA    GASTROINTESTINAL   ESPINOZA    GENITOURINARY/RENAL  ESPINOZA    HEMATOLOGIC   ESPINOZA    INFECTIOUS DISEASE  ESPINOZA    MUSCULOSKELETAL   ESPINOZA    PSYCHIATRY   #MDD   History of MDD. Home medications: Wellbutrin (unknown dosage Last taken on Wednesday morning  - Formal med recc in AM for dosage     PROPHYLACTIC MEASURE   F: None indicated   E: Replete as needed   N: NPO pending bedside dysphagia  DVT Prophylaxis: None indicated (IMPROVEDD score 0)  Activity as tolerated   Code status: FULL          59M w/ PMHx of AUD (with history of withdrawl seizures) and MDD p/w tremors, nausea, and sweats, found to be in alcohol withdrawal w/CIWA 15 at [facility] and transferred to [facility] for further management of acute alcohol withdrawal.     NEURO  AOx3 (at baseline)     #Alcohol Withdrawal   #Alcohol dependence   History of alcohol withdrawal seizures. History of many episodes of withdrawl.    Patient states he has MANY drinks per week. Last drink yesterday at 3PM  Current CIWA score of 10.   s/p 2L fluids, Mg and K repletion     Plan:  - completed loading dose of phenobarbitaol 20 mg/kg 650 mg -> 520 (3 hours later) -> 390 (3 hours later) on 09/26  - [facility] Protocol  - folic acid 1mg qday and thiamine 500 mg IV qday, MVI daily   - c/w Naltrexone 50 mg daily (can increase to 75 mg/100 mg daily depending on tolerability)    ENDOCRINE   ESPINOZA    CARDIOVASCULAR  ESPINOZA    PULM/RESP  ESPINOZA    GASTROINTESTINAL   ESPINOZA    GENITOURINARY/RENAL  ESPINOZA    HEMATOLOGIC   ESPINOZA    INFECTIOUS DISEASE  ESPINOZA    MUSCULOSKELETAL   ESPINOZA    PSYCHIATRY   #MDD   History of MDD. Home medications: Wellbutrin (unknown dosage, Last taken on Wednesday morning)  - Psych  consulted  - hold wellbutrin XR until he is out of acute alcohol withdrawal and then restart at 150 mg for 2 days with increase to 300 mg after.     PROPHYLACTIC MEASURE   F: None indicated   E: Replete as needed   N: Regular  DVT Prophylaxis: Lovenox  Activity as tolerated   Code status: FULL

## 2024-09-29 VITALS
RESPIRATION RATE: 16 BRPM | SYSTOLIC BLOOD PRESSURE: 149 MMHG | TEMPERATURE: 98 F | DIASTOLIC BLOOD PRESSURE: 84 MMHG | OXYGEN SATURATION: 98 % | HEART RATE: 67 BPM

## 2024-09-29 LAB
ALBUMIN SERPL ELPH-MCNC: 3.7 G/DL — SIGNIFICANT CHANGE UP (ref 3.3–5)
ALP SERPL-CCNC: 88 U/L — SIGNIFICANT CHANGE UP (ref 40–120)
ALT FLD-CCNC: 31 U/L — SIGNIFICANT CHANGE UP (ref 10–45)
ANION GAP SERPL CALC-SCNC: 5 MMOL/L — SIGNIFICANT CHANGE UP (ref 5–17)
AST SERPL-CCNC: 34 U/L — SIGNIFICANT CHANGE UP (ref 10–40)
BASOPHILS # BLD AUTO: 0.08 K/UL — SIGNIFICANT CHANGE UP (ref 0–0.2)
BASOPHILS NFR BLD AUTO: 1.1 % — SIGNIFICANT CHANGE UP (ref 0–2)
BILIRUB SERPL-MCNC: 0.4 MG/DL — SIGNIFICANT CHANGE UP (ref 0.2–1.2)
BUN SERPL-MCNC: 10 MG/DL — SIGNIFICANT CHANGE UP (ref 7–23)
CALCIUM SERPL-MCNC: 8.7 MG/DL — SIGNIFICANT CHANGE UP (ref 8.4–10.5)
CHLORIDE SERPL-SCNC: 103 MMOL/L — SIGNIFICANT CHANGE UP (ref 96–108)
CO2 SERPL-SCNC: 28 MMOL/L — SIGNIFICANT CHANGE UP (ref 22–31)
CREAT SERPL-MCNC: 0.93 MG/DL — SIGNIFICANT CHANGE UP (ref 0.5–1.3)
EGFR: 95 ML/MIN/1.73M2 — SIGNIFICANT CHANGE UP
EOSINOPHIL # BLD AUTO: 0.51 K/UL — HIGH (ref 0–0.5)
EOSINOPHIL NFR BLD AUTO: 7.3 % — HIGH (ref 0–6)
GLUCOSE SERPL-MCNC: 87 MG/DL — SIGNIFICANT CHANGE UP (ref 70–99)
HCT VFR BLD CALC: 37.8 % — LOW (ref 39–50)
HGB BLD-MCNC: 12.9 G/DL — LOW (ref 13–17)
IMM GRANULOCYTES NFR BLD AUTO: 0.1 % — SIGNIFICANT CHANGE UP (ref 0–0.9)
LYMPHOCYTES # BLD AUTO: 1.69 K/UL — SIGNIFICANT CHANGE UP (ref 1–3.3)
LYMPHOCYTES # BLD AUTO: 24.2 % — SIGNIFICANT CHANGE UP (ref 13–44)
MAGNESIUM SERPL-MCNC: 1.8 MG/DL — SIGNIFICANT CHANGE UP (ref 1.6–2.6)
MCHC RBC-ENTMCNC: 30.1 PG — SIGNIFICANT CHANGE UP (ref 27–34)
MCHC RBC-ENTMCNC: 34.1 GM/DL — SIGNIFICANT CHANGE UP (ref 32–36)
MCV RBC AUTO: 88.3 FL — SIGNIFICANT CHANGE UP (ref 80–100)
MONOCYTES # BLD AUTO: 0.73 K/UL — SIGNIFICANT CHANGE UP (ref 0–0.9)
MONOCYTES NFR BLD AUTO: 10.5 % — SIGNIFICANT CHANGE UP (ref 2–14)
NEUTROPHILS # BLD AUTO: 3.96 K/UL — SIGNIFICANT CHANGE UP (ref 1.8–7.4)
NEUTROPHILS NFR BLD AUTO: 56.8 % — SIGNIFICANT CHANGE UP (ref 43–77)
NRBC # BLD: 0 /100 WBCS — SIGNIFICANT CHANGE UP (ref 0–0)
PHOSPHATE SERPL-MCNC: 3.9 MG/DL — SIGNIFICANT CHANGE UP (ref 2.5–4.5)
PLATELET # BLD AUTO: 147 K/UL — LOW (ref 150–400)
POTASSIUM SERPL-MCNC: 4.1 MMOL/L — SIGNIFICANT CHANGE UP (ref 3.5–5.3)
POTASSIUM SERPL-SCNC: 4.1 MMOL/L — SIGNIFICANT CHANGE UP (ref 3.5–5.3)
PROT SERPL-MCNC: 6.2 G/DL — SIGNIFICANT CHANGE UP (ref 6–8.3)
RBC # BLD: 4.28 M/UL — SIGNIFICANT CHANGE UP (ref 4.2–5.8)
RBC # FLD: 12.9 % — SIGNIFICANT CHANGE UP (ref 10.3–14.5)
SODIUM SERPL-SCNC: 136 MMOL/L — SIGNIFICANT CHANGE UP (ref 135–145)
WBC # BLD: 6.98 K/UL — SIGNIFICANT CHANGE UP (ref 3.8–10.5)
WBC # FLD AUTO: 6.98 K/UL — SIGNIFICANT CHANGE UP (ref 3.8–10.5)

## 2024-09-29 PROCEDURE — 82962 GLUCOSE BLOOD TEST: CPT

## 2024-09-29 PROCEDURE — 80184 ASSAY OF PHENOBARBITAL: CPT

## 2024-09-29 PROCEDURE — 80048 BASIC METABOLIC PNL TOTAL CA: CPT

## 2024-09-29 PROCEDURE — 80307 DRUG TEST PRSMV CHEM ANLYZR: CPT

## 2024-09-29 PROCEDURE — 97161 PT EVAL LOW COMPLEX 20 MIN: CPT

## 2024-09-29 PROCEDURE — 85025 COMPLETE CBC W/AUTO DIFF WBC: CPT

## 2024-09-29 PROCEDURE — 80076 HEPATIC FUNCTION PANEL: CPT

## 2024-09-29 PROCEDURE — 96375 TX/PRO/DX INJ NEW DRUG ADDON: CPT

## 2024-09-29 PROCEDURE — 99239 HOSP IP/OBS DSCHRG MGMT >30: CPT | Mod: GC

## 2024-09-29 PROCEDURE — 80053 COMPREHEN METABOLIC PANEL: CPT

## 2024-09-29 PROCEDURE — 36415 COLL VENOUS BLD VENIPUNCTURE: CPT

## 2024-09-29 PROCEDURE — 84100 ASSAY OF PHOSPHORUS: CPT

## 2024-09-29 PROCEDURE — 86850 RBC ANTIBODY SCREEN: CPT

## 2024-09-29 PROCEDURE — 96374 THER/PROPH/DIAG INJ IV PUSH: CPT

## 2024-09-29 PROCEDURE — 99285 EMERGENCY DEPT VISIT HI MDM: CPT | Mod: 25

## 2024-09-29 PROCEDURE — 86900 BLOOD TYPING SEROLOGIC ABO: CPT

## 2024-09-29 PROCEDURE — 86901 BLOOD TYPING SEROLOGIC RH(D): CPT

## 2024-09-29 PROCEDURE — 84443 ASSAY THYROID STIM HORMONE: CPT

## 2024-09-29 PROCEDURE — 83735 ASSAY OF MAGNESIUM: CPT

## 2024-09-29 RX ORDER — NALTREXONE HYDROCHLORIDE 50 MG/1
1 TABLET, FILM COATED ORAL
Qty: 0 | Refills: 0 | DISCHARGE
Start: 2024-09-29

## 2024-09-29 RX ORDER — NALTREXONE HYDROCHLORIDE 50 MG/1
1 TABLET, FILM COATED ORAL
Qty: 30 | Refills: 0
Start: 2024-09-29 | End: 2024-10-28

## 2024-09-29 RX ORDER — THIAMINE HYDROCHLORIDE 100 MG/ML
100 INJECTION, SOLUTION INTRAMUSCULAR; INTRAVENOUS DAILY
Refills: 0 | Status: DISCONTINUED | OUTPATIENT
Start: 2024-09-29 | End: 2024-09-29

## 2024-09-29 RX ORDER — MULTI VITAMIN/MINERAL SUPPLEMENT WITH ASCORBIC ACID, NIACIN, PYRIDOXINE, PANTOTHENIC ACID, FOLIC ACID, RIBOFLAVIN, THIAMIN, BIOTIN, COBALAMIN AND ZINC. 60; 20; 12.5; 10; 10; 1.7; 1.5; 1; .3; .006 MG/1; MG/1; MG/1; MG/1; MG/1; MG/1; MG/1; MG/1; MG/1; MG/1
1 TABLET, COATED ORAL
Qty: 0 | Refills: 0 | DISCHARGE
Start: 2024-09-29

## 2024-09-29 RX ORDER — FOLIC ACID 1 MG/1
1 TABLET ORAL
Qty: 0 | Refills: 0 | DISCHARGE
Start: 2024-09-29

## 2024-09-29 RX ORDER — THIAMINE HYDROCHLORIDE 100 MG/ML
1 INJECTION, SOLUTION INTRAMUSCULAR; INTRAVENOUS
Qty: 0 | Refills: 0 | DISCHARGE
Start: 2024-09-29

## 2024-09-29 RX ADMIN — THIAMINE HYDROCHLORIDE 105 MILLIGRAM(S): 100 INJECTION, SOLUTION INTRAMUSCULAR; INTRAVENOUS at 02:07

## 2024-09-29 RX ADMIN — Medication 150 MILLIGRAM(S): at 11:26

## 2024-09-29 RX ADMIN — NALTREXONE HYDROCHLORIDE 50 MILLIGRAM(S): 50 TABLET, FILM COATED ORAL at 11:26

## 2024-09-29 RX ADMIN — FOLIC ACID 1 MILLIGRAM(S): 1 TABLET ORAL at 11:26

## 2024-09-29 RX ADMIN — THIAMINE HYDROCHLORIDE 105 MILLIGRAM(S): 100 INJECTION, SOLUTION INTRAMUSCULAR; INTRAVENOUS at 10:44

## 2024-09-29 RX ADMIN — MULTI VITAMIN/MINERAL SUPPLEMENT WITH ASCORBIC ACID, NIACIN, PYRIDOXINE, PANTOTHENIC ACID, FOLIC ACID, RIBOFLAVIN, THIAMIN, BIOTIN, COBALAMIN AND ZINC. 1 TABLET(S): 60; 20; 12.5; 10; 10; 1.7; 1.5; 1; .3; .006 TABLET, COATED ORAL at 11:26

## 2024-09-29 NOTE — DISCHARGE NOTE PROVIDER - NSDCMRMEDTOKEN_GEN_ALL_CORE_FT
buPROPion 150 mg/24 hours (XL) oral tablet, extended release: 1 tab(s) orally once a day  folic acid 1 mg oral tablet: 1 tab(s) orally once a day  Multiple Vitamins oral tablet: 1 tab(s) orally once a day  thiamine 100 mg oral tablet: 1 tab(s) orally once a day   buPROPion 150 mg/24 hours (XL) oral tablet, extended release: 1 tab(s) orally once a day  folic acid 1 mg oral tablet: 1 tab(s) orally once a day  Multiple Vitamins oral tablet: 1 tab(s) orally once a day  naltrexone 50 mg oral tablet: 1 tab(s) orally once a day  thiamine 100 mg oral tablet: 1 tab(s) orally once a day   buPROPion 150 mg/24 hours (XL) oral tablet, extended release: 1 tab(s) orally once a day  folic acid 1 mg oral tablet: 1 tab(s) orally once a day  Multiple Vitamins oral tablet: 1 tab(s) orally once a day  naltrexone 50 mg oral tablet: 1 tab(s) orally once a day  naltrexone 50 mg oral tablet: 1 tab(s) orally once a day  thiamine 100 mg oral tablet: 1 tab(s) orally once a day

## 2024-09-29 NOTE — PROGRESS NOTE ADULT - ASSESSMENT
59M w/ PMHx of AUD (with history of withdrawal seizures) and MDD p/w tremors, nausea, and sweats, found to be in alcohol withdrawal w/CIWA 15 at [facility] and transferred to [facility] for further management of acute alcohol withdrawal.

## 2024-09-29 NOTE — PROGRESS NOTE ADULT - PROBLEM SELECTOR PLAN 4
F: None indicated   E: Replete as needed   N: Regular  DVT Prophylaxis: Lovenox  Activity as tolerated   Code status: FULL
F: None indicated   E: Replete as needed   N: Regular  DVT Prophylaxis: Lovenox  Activity as tolerated   Code status: FULL

## 2024-09-29 NOTE — PHYSICAL THERAPY INITIAL EVALUATION ADULT - ADDITIONAL COMMENTS
Pt has been indpt with all ADLs and IADls prior to admission. Pt denies use of AD for ambulation and denies falls. prior.

## 2024-09-29 NOTE — DISCHARGE NOTE PROVIDER - ATTENDING DISCHARGE PHYSICAL EXAMINATION:
Patient was seen and examined at bedside on 9/29/2024 at 11 am. Patient reports he feels well. Denies SOB, CP. Does not feel that he is in withdrawal. ROS is otherwise negative. Vitals, labwork and pertinent imaging reviewed. Exam - NAD, AAO x 4, PERRLA, EOMI, MMM, supple neck, chest - CTA b/l, CV - rrr, s1s2, no m/r/g, abd - soft, NTND, + BS, ext - wwp, psych - normal affect, CIWA - 0    Plan:  -Patient is medically ready for d/c  -Has outpatient follow up, c/w Naltrexone

## 2024-09-29 NOTE — DISCHARGE NOTE PROVIDER - NSDCCPCAREPLAN_GEN_ALL_CORE_FT
PRINCIPAL DISCHARGE DIAGNOSIS  Diagnosis: Alcohol withdrawal  Assessment and Plan of Treatment:      PRINCIPAL DISCHARGE DIAGNOSIS  Diagnosis: Alcohol withdrawal  Assessment and Plan of Treatment: Upon discharge following your alcohol withdrawal treatment, it's crucial to continue your prescribed medications and avoid alcohol. Monitor for concerning symptoms like increased anxiety, severe sweating, hallucinations, or seizures, and seek immediate help if they occur. Schedule follow-up appointments with your primary care and mental health providers, and consider joining support groups like Alcoholics Anonymous. Maintain a balanced diet, stay hydrated, and establish a regular sleep routine while avoiding triggers. Lean on loved ones for support, and don't hesitate to reach out to your healthcare provider with any questions or concerns. Recovery is a journey—take it one day at a time.     PRINCIPAL DISCHARGE DIAGNOSIS  Diagnosis: Alcohol withdrawal  Assessment and Plan of Treatment: Upon discharge following your alcohol withdrawal treatment, it's crucial to continue your prescribed medications and avoid alcohol. Monitor for concerning symptoms like increased anxiety, severe sweating, hallucinations, or seizures, and seek immediate help if they occur. Schedule follow-up appointments with your primary care and mental health providers, and consider joining support groups like Alcoholics Anonymous. Maintain a balanced diet, stay hydrated, and establish a regular sleep routine while avoiding triggers. Lean on loved ones for support, and don't hesitate to reach out to your healthcare provider with any questions or concerns. Recovery is a journey—take it one day at a time.      SECONDARY DISCHARGE DIAGNOSES  Diagnosis: Major depressive disorder  Assessment and Plan of Treatment:

## 2024-09-29 NOTE — DISCHARGE NOTE NURSING/CASE MANAGEMENT/SOCIAL WORK - NSDCPEFALRISK_GEN_ALL_CORE
For information on Fall & Injury Prevention, visit: [email or web address] OR  [email or web address] OR  https://www.cdc.gov/steadi/patient.html

## 2024-09-29 NOTE — DISCHARGE NOTE PROVIDER - HOSPITAL COURSE
#Discharge: do not delete    Patient is __ yo M/F with past medical history of _____ presented with _____, found to have _____ (one liner)    Hospital course (by problem):    Patient was discharged to: (home/[facility]/acute rehab/hospice, etc, and with what services – home health PT/RN? Home O2?)    New medications: --  Changes to old medications: --  Medications that were stopped: --    Items to follow up as outpatient:     Physical exam at the time of discharge: AAOx3; NAD; RRR, no murmurs; lungs CLAB; abd NT/ND; extremities wwp, no peripheral edema.     Patient was medically optimized, stable and ready for discharge. Plan of care and return precautions were discussed with the patient who verbally stated understanding. On the day of discharge, the patient was seen and examined. Symptoms improved. Vital signs are stable. Labs and imaging reviewed. Patient is medically optimized and hemodynamically stable. Return precautions discussed, medication teach back done, and importance of physician follow up emphasized. The patient verbalized understanding. #Discharge: do not delete    59M w/ PMHx of AUD and MDD p/w tremors, nausea, and sweats, found to be in alcohol withdrawl w/CIWA 15 at [facility] and transferred to [facility]. History of many episodes of presenting to the ED for alcohol intoxication. Has experienced withdrawl seizures in the distant past. Admits to drinking lots of alcohol earlier today. Denies drug usage, headstrike or trauma. On assessment, he is able to speak in complete full sentences.  No cough or CP or SOB.  No external signs of trauma. Last alcohol ingestion today ~3pm.      Hospital course (by problem):  #Alcohol withdrawal.   History of alcohol withdrawal seizures. History of many episodes of withdrawal. Patient states he has MANY drinks per week. Last drink 3 days prior to admission. Patient is at this moment in time, almost out of the 3 day window for withdrawal  CIWA on admission of 15  CIWA of 10 prior to transfer to the floors  CIWA of 6 during while on [facility] during patient interview   CIWA 0-1 in days prior to discharge  - naltrexone 50mg qd  - folic acid  - thiamine   - multivitamin    #Major depressive disorder.   History of MDD. Home medications: Wellbutrin (unknown dosage, Last taken on Wednesday morning)  - wellbutrin XR restart at 150 mg for 2 days with increase to 300 mg after.    #Unsteady gait when walking.   Reported by team that patient was having difficulty with walking. Was thought to be possible interaction with phenobarbital and patient's home MDD medication Wellbutrin. Per Psyche recc, the Wellbutrin was held.  - PT eval      Patient was discharged to: home  New medications: naltrexone, folic acid, thiamine, multivitamin  Changes to old medications: --  Medications that were stopped: --    Items to follow up as outpatient:     Physical exam at the time of discharge: AAOx3; NAD; RRR, no murmurs; lungs CLAB; abd NT/ND; extremities wwp, no peripheral edema.     Patient was medically optimized, stable and ready for discharge. Plan of care and return precautions were discussed with the patient who verbally stated understanding. On the day of discharge, the patient was seen and examined. Symptoms improved. Vital signs are stable. Labs and imaging reviewed. Patient is medically optimized and hemodynamically stable. Return precautions discussed, medication teach back done, and importance of physician follow up emphasized. The patient verbalized understanding. #Discharge: do not delete    59M w/ PMHx of AUD and MDD p/w tremors, nausea, and sweats, found to be in alcohol withdrawal w/ CIWA 15 at [facility] and transferred to [facility]. History of many episodes of presenting to the ED for alcohol intoxication. Has experienced withdrawal seizures in the distant past. Admits to drinking lots of alcohol earlier today. Denies drug usage, head strike or trauma. On assessment, he is able to speak in complete full sentences.  No cough or CP or SOB.  No external signs of trauma. Last alcohol ingestion today ~3pm.      Hospital course (by problem):  #Alcohol withdrawal.   History of alcohol withdrawal seizures. History of many episodes of withdrawal. Patient states he has MANY drinks per week. Last drink 3 days prior to admission. Patient is at this moment in time, almost out of the 3 day window for withdrawal  CIWA on admission of 15  CIWA of 10 prior to transfer to the floors  CIWA of 6 during while on [facility] during patient interview   CIWA 0-1 in days prior to discharge  - naltrexone 50mg qd  - folic acid  - thiamine   - multivitamin    #Major depressive disorder.   History of MDD. Home medications: Wellbutrin (unknown dosage, Last taken on Wednesday morning)  - wellbutrin XR restart at 150 mg for 2 days with increase to 300 mg after.    #Unsteady gait when walking.   Reported by team that patient was having difficulty with walking. Was thought to be possible interaction with phenobarbital and patient's home MDD medication Wellbutrin. Per Psyche recc, the Wellbutrin was held.  - PT eval      Patient was discharged to: home  New medications: naltrexone, folic acid, thiamine, multivitamin  Changes to old medications: --  Medications that were stopped: --    Items to follow up as outpatient:     Physical exam at the time of discharge: AAOx3; NAD; RRR, no murmurs; lungs CLAB; abd NT/ND; extremities wwp, no peripheral edema.     Patient was medically optimized, stable and ready for discharge. Plan of care and return precautions were discussed with the patient who verbally stated understanding. On the day of discharge, the patient was seen and examined. Symptoms improved. Vital signs are stable. Labs and imaging reviewed. Patient is medically optimized and hemodynamically stable. Return precautions discussed, medication teach back done, and importance of physician follow up emphasized. The patient verbalized understanding.

## 2024-09-29 NOTE — DISCHARGE NOTE PROVIDER - NSDCFUADDAPPT_GEN_ALL_CORE_FT
(1) Please follow up with [facility]. You can call  to make an appointment within a week.  [address]  [place] [address]

## 2024-09-29 NOTE — DISCHARGE NOTE NURSING/CASE MANAGEMENT/SOCIAL WORK - NSDCFUADDAPPT_GEN_ALL_CORE_FT
(1) Please follow up with [facility]. You can call  to make an appointment within a week.  [address]  [place] [address]

## 2024-09-29 NOTE — PROGRESS NOTE ADULT - TIME BILLING
Evaluation of patient, review of charts, d/w PCM
- Ordering, reviewing, and interpreting labs, testing, and imaging.  - Independently obtaining a review of systems and performing a physical exam  - Reviewing consultant documentation/recommendations in addition to discussing plan of care with consultants.  - Counselling and educating patient and family regarding interpretation of aforementioned items and plan of care.

## 2024-09-29 NOTE — DISCHARGE NOTE NURSING/CASE MANAGEMENT/SOCIAL WORK - PATIENT PORTAL LINK FT
You can access the FollowMyHealth Patient Portal offered by [facility] by registering at the following website: [email or web address]. By joining [employer]’s FollowMyHealth portal, you will also be able to view your health information using other applications (apps) compatible with our system.

## 2024-09-29 NOTE — PHYSICAL THERAPY INITIAL EVALUATION ADULT - PERTINENT HX OF CURRENT PROBLEM, REHAB EVAL
59M w/ PMHx of AUD and MDD p/w tremors, nausea, and sweats, found to be in alcohol withdrawl w/CIWA 15 at [facility] and transferred to [facility]. History of many episodes of presenting to the ED for alcohol intoxication. Has experienced withdrawl seizures in the distant past. Admits to drinking lots of alcohol earlier today. Denies drug usage, headstrike or trauma. On assessment, he is able to speak in complete full sentences.  No cough or CP or SOB.  No external signs of trauma. Last alcohol ingestion today ~3pm.

## 2024-09-29 NOTE — DISCHARGE NOTE PROVIDER - CARE PROVIDERS DIRECT ADDRESSES
[email or web address]

## 2024-09-29 NOTE — DISCHARGE NOTE PROVIDER - CARE PROVIDER_API CALL
Siria Leavitt  Internal Medicine  [address] [place] [id number]  Phone: [phone]  Fax: [phone]  Follow Up Time: 1 week   Yajaira Landon  Internal Medicine  [address]  [place] [address]  Phone: [phone]  Fax: [phone]  Follow Up Time: 1 week

## 2024-09-29 NOTE — PROGRESS NOTE ADULT - PROBLEM SELECTOR PLAN 3
Reported by [facility] team that patient was having difficulty with walking. Was thought to be possible interaction with phenobarbital and patient's home MDD medication Wellbutrin. Per Psyche recc, the Wellbutrin was held.  -f/u PT consult for gait assessment

## 2024-09-29 NOTE — PROGRESS NOTE ADULT - SUBJECTIVE AND OBJECTIVE BOX
Overnight events: ANNA      Subjective: ANNA    SUBJECTIVE: Patient seen and examined at bedside.  Patient reports that he feels well. Denies visual, tactile, or auditory hallucinations. Has no acute complaints.     Denies fever, chills, chest pain, sob.    ROS: negative unless otherwise stated above.      Vital Signs Last 24 Hrs  T(C): 36.5 (29 Sep 2024 05:14), Max: 36.9 (28 Sep 2024 20:51)  T(F): 97.7 (29 Sep 2024 05:14), Max: 98.4 (28 Sep 2024 20:51)  HR: 50 (29 Sep 2024 05:14) (50 - 67)  BP: 103/63 (29 Sep 2024 05:14) (103/63 - 148/83)  BP(mean): 100 (28 Sep 2024 14:34) (100 - 106)  RR: 18 (29 Sep 2024 05:14) (17 - 18)  SpO2: 97% (29 Sep 2024 05:14) (95% - 98%)    Parameters below as of 29 Sep 2024 05:14  Patient On (Oxygen Delivery Method): room air            PHYSICAL EXAM:    General: NAD  HEENT: MMM. EOM intact. No nystagmus  Neck: supple  Cardiovascular: +S1/S2; RRR  Respiratory: CTA B/L; no W/R/R  Gastrointestinal: soft, NT/ND, TTP in the UR quad  Extremities: WWP; no edema, clubbing or cyanosis  Vascular: 2+ radial, DP/PT pulses B/L  Neurological: AAOx3; no focal deficits. No hyperreflexia noted on exam.  Psych: normal affect    MEDICATIONS:  MEDICATIONS  (STANDING):  enoxaparin Injectable 40 milliGRAM(s) SubCutaneous every 24 hours  folic acid 1 milliGRAM(s) Oral daily  influenza   Vaccine 0.5 milliLiter(s) IntraMuscular once  multivitamin 1 Tablet(s) Oral daily  naltrexone 50 milliGRAM(s) Oral daily  thiamine IVPB 500 milliGRAM(s) IV Intermittent every 8 hours    MEDICATIONS  (PRN):  PHENobarbital Injectable 130 milliGRAM(s) IV Push every 15 minutes PRN RASS>3 or CIWA > 7      ALLERGIES:  Allergies    penicillin (Hives)    Intolerances        LABS:                          12.9   6.98  )-----------( 147      ( 29 Sep 2024 05:30 )             37.8   09-29    136  |  103  |  10  ----------------------------<  87  4.1   |  28  |  0.93    Ca    8.7      29 Sep 2024 05:30  Phos  3.9     09-29  Mg     1.8     09-29    TPro  6.2  /  Alb  3.7  /  TBili  0.4  /  DBili  x   /  AST  34  /  ALT  31  /  AlkPhos  88  09-29          Urinalysis Basic - ( 28 Sep 2024 06:20 )    Color: x / Appearance: x / SG: x / pH: x  Gluc: 93 mg/dL / Ketone: x  / Bili: x / Urobili: x   Blood: x / Protein: x / Nitrite: x   Leuk Esterase: x / RBC: x / WBC x   Sq Epi: x / Non Sq Epi: x / Bacteria: x      CAPILLARY BLOOD GLUCOSE      POCT Blood Glucose.: 110 mg/dL (26 Sep 2024 20:26)      RADIOLOGY & ADDITIONAL TESTS: Reviewed.

## 2024-09-29 NOTE — DISCHARGE NOTE PROVIDER - PROVIDER TOKENS
PROVIDER:[TOKEN:[[id number]],FOLLOWUP:[1 week]] PROVIDER:[TOKEN:[[id number]],FOLLOWUP:[1 week]]

## 2024-09-29 NOTE — PROGRESS NOTE ADULT - PROBLEM SELECTOR PLAN 1
History of alcohol withdrawal seizures. History of many episodes of withdrawal.   Patient states he has MANY drinks per week. Last drink 3 days ago   Patient is at this moment in time, almost out of the 3 day window for withdrawal  CIWA on admission of 15  CIWA of 10 prior to transfer to the floors  CIWA of 6 during while on RMF during patient interview       Had 4 episodes of diarrhea when transitioned to the floors. most likely in the setting of alcohol withdrawal.     Patient also reports that he has peripheral neuropathy, especially in his hands from alcohol use.     Plan:  -Resolved  - follow CIWA Protocol  - folic acid 1mg qd   -thiamine 500 mg IV qd  -multivitamin qd  - Naltrexone 50 mg qd (can increase to 75 mg/100 mg daily depending on tolerability)  -f/u with social work

## 2024-09-29 NOTE — PROGRESS NOTE ADULT - PROBLEM SELECTOR PLAN 2
History of MDD.   Home medications: Wellbutrin (unknown dosage, Last taken on Wednesday morning)  - Psych  consulted; f/u reccs  - holding  wellbutrin XR until he is out of acute alcohol withdrawal and then restart at 150 mg for 2 days with increase to 300 mg after.
History of MDD.   Home medications: Wellbutrin (unknown dosage, Last taken on Wednesday morning)  - Psych  consulted; f/u reccs  - holding  wellbutrin XR until he is out of acute alcohol withdrawal and then restart at 150 mg for 2 days with increase to 300 mg after.

## 2024-10-07 DIAGNOSIS — Z88.0 ALLERGY STATUS TO PENICILLIN: ICD-10-CM

## 2024-10-07 DIAGNOSIS — G62.9 POLYNEUROPATHY, UNSPECIFIED: ICD-10-CM

## 2024-10-07 DIAGNOSIS — R26.81 UNSTEADINESS ON FEET: ICD-10-CM

## 2024-10-07 DIAGNOSIS — F10.139 ALCOHOL ABUSE WITH WITHDRAWAL, UNSPECIFIED: ICD-10-CM

## 2024-10-07 DIAGNOSIS — F32.9 MAJOR DEPRESSIVE DISORDER, SINGLE EPISODE, UNSPECIFIED: ICD-10-CM

## 2024-10-14 ENCOUNTER — NON-APPOINTMENT (OUTPATIENT)
Age: 59
End: 2024-10-14

## 2024-10-14 PROBLEM — Z00.00 ENCOUNTER FOR PREVENTIVE HEALTH EXAMINATION: Status: ACTIVE | Noted: 2024-10-14

## 2025-01-13 NOTE — PROGRESS NOTE ADULT - TIME-BASED BILLING (NON-CRITICAL CARE)
Patient has a schedule appt for 01/13/2025 provider will discuss lab results at time of visit.   
Time-based billing (NON-critical care)
Time-based billing (NON-critical care)

## 2025-01-15 NOTE — ED CDU PROVIDER SUBSEQUENT DAY NOTE - CPE EDP SKIN NORM
Orders:    POCT UA (Automated) docked device    POCT ALBUMIN RANDOM URINE DOCKED DEVICE    CBC    Comprehensive Metabolic Panel    Lipid Panel    POCT fingerstick glucose manually resulted    POCT Glycosylated Hemoglobin (HGB A1C) docked device    Thyroid Stimulating Hormone     normal...

## 2025-04-03 ENCOUNTER — EMERGENCY (EMERGENCY)
Facility: HOSPITAL | Age: 60
LOS: 1 days | Discharge: HOME | End: 2025-04-03
Attending: EMERGENCY MEDICINE | Admitting: EMERGENCY MEDICINE
Payer: MEDICAID

## 2025-04-03 VITALS
OXYGEN SATURATION: 97 % | HEART RATE: 72 BPM | TEMPERATURE: 98 F | WEIGHT: 184.97 LBS | RESPIRATION RATE: 18 BRPM | SYSTOLIC BLOOD PRESSURE: 121 MMHG | DIASTOLIC BLOOD PRESSURE: 77 MMHG | HEIGHT: 76 IN

## 2025-04-03 PROCEDURE — 99283 EMERGENCY DEPT VISIT LOW MDM: CPT

## 2025-04-03 NOTE — ED ADULT NURSE NOTE - CHIEF COMPLAINT QUOTE
PATRICIA NUNEZ. Crew reports Pt was found sleeping on curb with bottle of etoh. Pt admits to drinking etoh tonight, denies drug use or further at triage. No obvious injuries observed or reported. [facility].

## 2025-04-03 NOTE — ED ADULT TRIAGE NOTE - CHIEF COMPLAINT QUOTE
PATRICIA NUNEZ. Crew reports Pt was found sleeping on curb with bottle of etoh. Pt admits to drinking etoh tonight, denies drug use or further at triage. No obvious injuries observed or reported. [facility].

## 2025-04-03 NOTE — ED ADULT NURSE NOTE - NSFALLRISKINTERV_ED_ALL_ED
Assistance OOB with selected safe patient handling equipment if applicable/Assistance with ambulation/Communicate fall risk and risk factors to all staff, patient, and family/Monitor gait and stability/Monitor for mental status changes and reorient to person, place, and time, as needed/Provide visual cue: yellow wristband, yellow gown, etc/Reinforce activity limits and safety measures with patient and family/Toileting schedule using arm’s reach rule for commode and bathroom/Use of alarms - bed, stretcher, chair and/or video monitoring/Call bell, personal items and telephone in reach/Instruct patient to call for assistance before getting out of bed/chair/stretcher/Non-slip footwear applied when patient is off stretcher/[place] to call system/Physically safe environment - no spills, clutter or unnecessary equipment/Purposeful Proactive Rounding/Room/bathroom lighting operational, light cord in reach

## 2025-04-03 NOTE — ED ADULT NURSE NOTE - OBJECTIVE STATEMENT
Received patient alert and oriented x 4 denies chest pain or SOB, no cardiac or respiratory distress noted. Patient stated that reason for ER visit is due to beign brought in by EMS due to finding patient with a bottle of ETOH laying down in the curb in the street.   Pt admits to drinking etoh tonight, denies drug use or further at triage. No obvious injuries observed or reported. [facility].

## 2025-04-04 VITALS
DIASTOLIC BLOOD PRESSURE: 64 MMHG | HEART RATE: 61 BPM | RESPIRATION RATE: 18 BRPM | TEMPERATURE: 98 F | OXYGEN SATURATION: 98 % | SYSTOLIC BLOOD PRESSURE: 114 MMHG

## 2025-04-04 NOTE — ED PROVIDER NOTE - PATIENT PORTAL LINK FT
You can access the FollowMyHealth Patient Portal offered by [facility] by registering at the following website: [email or web address]. By joining [facility]’s FollowMyHealth portal, you will also be able to view your health information using other applications (apps) compatible with our system.

## 2025-04-04 NOTE — ED PROVIDER NOTE - CARE PLAN
Called patient, no answer, left voicemail to call the office back.   Principal Discharge DX:	Alcohol intoxication   1

## 2025-04-04 NOTE — ED PROVIDER NOTE - NSFOLLOWUPINSTRUCTIONS_ED_ALL_ED_FT
Alcohol Use Disorder    WHAT YOU NEED TO KNOW:    Alcohol use disorder (AUD) is problem drinking. AUD includes alcohol abuse and alcohol dependency.     DISCHARGE INSTRUCTIONS:    Seek care immediately if:     Your heart is beating faster than usual.      You have hallucinations.      You cannot remember what happens while you are drinking.      You have seizures.    Contact your healthcare provider if:     You are anxious and have nausea.      Your hands are shaky and you are sweating heavily.      You have questions or concerns about your condition or care.    Follow up with your healthcare provider as directed: Do not try to stop drinking on your own. Your healthcare provider may need to help you withdraw from alcohol safely. He may need to admit you to the hospital. You may also need any of the following treatments:    Medicines to decrease your craving for alcohol      Support groups such as Alcoholics Anonymous       Therapy from a psychiatrist or psychologist       Admission to an inpatient facility for treatment for severe AUD    Interested in discussing options to reduce your alcohol or drug use?      [facility]: [phone]   [facility] Substance Abuse Services: [phone], option #2   Methadone Maintenance & Ambulatory Opiate Detox: [phone]  [facility]: [phone]  [facility]: [phone]  [facility]: [phone]    [facility]: [phone], option #2   [facility]: [phone]    [facility]: [phone]    [facility] Intake: [phone]  [facility] Chemical Dependency/Ancillary Withdrawal: [phone]  [facility] Methadone Maintenance: [phone]    [facility]: [phone]  [facility] Addiction Treatment Services: [phone]    [facility]: 4-881-6-HOPENY    [facility] Office of Alcoholism and Substance Abuse Services (OASAS): https://www.[place]asas.[place].gov/providerdirectory/  [facility] Services and Psych[facility] Interventions Research (CASPIR)  www.[facility].org     Interested in discussing options to reduce your tobacco use?    [facility] for Tobacco Control:  [phone]  [facility] QUITLINE: 5-996-JA-QUITS ([phone])    Interested in learning more about substance use?      http://rethinkingdrinking.niaaa.nih.gov   https://www.drugabuse.gov/patients-families     Learn more about opioid overdose prevention programs in [facility]:  http://www.[facility].ny.[facility]/diseases/aids/general/opioid_overdose_prevention/

## 2025-04-07 DIAGNOSIS — F10.129 ALCOHOL ABUSE WITH INTOXICATION, UNSPECIFIED: ICD-10-CM
